# Patient Record
Sex: FEMALE | Race: WHITE | NOT HISPANIC OR LATINO | Employment: FULL TIME | URBAN - METROPOLITAN AREA
[De-identification: names, ages, dates, MRNs, and addresses within clinical notes are randomized per-mention and may not be internally consistent; named-entity substitution may affect disease eponyms.]

---

## 2017-04-24 ENCOUNTER — OFFICE VISIT (OUTPATIENT)
Dept: URGENT CARE | Facility: CLINIC | Age: 27
End: 2017-04-24
Payer: COMMERCIAL

## 2017-04-24 VITALS
TEMPERATURE: 98.6 F | RESPIRATION RATE: 16 BRPM | WEIGHT: 121 LBS | BODY MASS INDEX: 22.26 KG/M2 | DIASTOLIC BLOOD PRESSURE: 80 MMHG | HEART RATE: 85 BPM | OXYGEN SATURATION: 99 % | SYSTOLIC BLOOD PRESSURE: 110 MMHG | HEIGHT: 62 IN

## 2017-04-24 DIAGNOSIS — L50.9 URTICARIA: ICD-10-CM

## 2017-04-24 DIAGNOSIS — J30.2 SEASONAL ALLERGIC RHINITIS, UNSPECIFIED ALLERGIC RHINITIS TRIGGER: ICD-10-CM

## 2017-04-24 PROCEDURE — 99999 PR NO CHARGE: CPT | Performed by: PHYSICIAN ASSISTANT

## 2017-04-24 PROCEDURE — 99204 OFFICE O/P NEW MOD 45 MIN: CPT | Mod: 25 | Performed by: PHYSICIAN ASSISTANT

## 2017-04-24 RX ORDER — TRIAMCINOLONE ACETONIDE 40 MG/ML
40 INJECTION, SUSPENSION INTRA-ARTICULAR; INTRAMUSCULAR ONCE
Status: COMPLETED | OUTPATIENT
Start: 2017-04-24 | End: 2017-04-24

## 2017-04-24 RX ADMIN — TRIAMCINOLONE ACETONIDE 40 MG: 40 INJECTION, SUSPENSION INTRA-ARTICULAR; INTRAMUSCULAR at 11:19

## 2017-04-24 ASSESSMENT — ENCOUNTER SYMPTOMS
VOMITING: 0
MYALGIAS: 1
NAUSEA: 0
COUGH: 0
HEADACHES: 0
SHORTNESS OF BREATH: 0
WHEEZING: 0
PALPITATIONS: 0
DIARRHEA: 0
URTICARIA: 1
SORE THROAT: 1
DIZZINESS: 0
FEVER: 0
ABDOMINAL PAIN: 0
CHILLS: 1

## 2017-04-24 NOTE — Clinical Note
April 24, 2017         Patient: Sakshi Gibbs   YOB: 1990   Date of Visit: 4/24/2017           To Whom it May Concern:    Sakshi Gibbs was seen in my clinic on 4/24/2017. She may return to work on Tuesday April 25th.    If you have any questions or concerns, please don't hesitate to call.        Sincerely,           Jose Raul Mann PA-C  Electronically Signed

## 2017-04-24 NOTE — MR AVS SNAPSHOT
"        Sakshi Lockhartryanne   2017 10:00 AM   Office Visit   MRN: [id number]    Department:  [facility]   Dept Phone:  [phone]    Description:  Female : 1990   Provider:  Jose Raul Mann PA-C           Reason for Visit     Urticaria x 1 day/ face/ shoulders/legs/ forearms/has a lot of allergys      Allergies as of 2017     Allergen Noted Reactions    Sulfa Drugs 2017         You were diagnosed with     Urticaria   [[id number]]       Seasonal allergic rhinitis, unspecified allergic rhinitis trigger   [[id number]]         Vital Signs     Blood Pressure Pulse Temperature Respirations Height Weight    110/80 mmHg 85 37 °C (98.6 °F) 16 1.575 m (5' 2.01\") 54.885 kg (121 lb)    Body Mass Index Oxygen Saturation Breastfeeding? Smoking Status          22.13 kg/m2 99% No Never Smoker         Basic Information     Date Of Birth Sex Race Ethnicity Preferred Language    1990 Female White Non- English      Health Maintenance        Date Due Completion Dates    IMM DTaP/Tdap/Td Vaccine (1 - Tdap) 2008 ---    PAP SMEAR 2010 ---            Current Immunizations     No immunizations on file.      Below and/or attached are the medications your provider expects you to take. Review all of your home medications and newly ordered medications with your provider and/or pharmacist. Follow medication instructions as directed by your provider and/or pharmacist. Please keep your medication list with you and share with your provider. Update the information when medications are discontinued, doses are changed, or new medications (including over-the-counter products) are added; and carry medication information at all times in the event of emergency situations     Allergies:  SULFA DRUGS - (reactions not documented)               Medications  Valid as of: 2017 - 11:26 AM    Generic Name Brand Name Tablet Size Instructions for use    Loratadine   Take  by mouth.        .                 "   Medicines prescribed today were sent to:     [facility]/PHARMACY #[id number] - [place] - [address]    [address] [place] [address]    Phone: [phone] Fax: [phone]    Open 24 Hours?: No      Medication refill instructions:       If your prescription bottle indicates you have medication refills left, it is not necessary to call your provider’s office. Please contact your pharmacy and they will refill your medication.    If your prescription bottle indicates you do not have any refills left, you may request refills at any time through one of the following ways: The online [employer] system (except Urgent Care), by calling your provider’s office, or by asking your pharmacy to contact your provider’s office with a refill request. Medication refills are processed only during regular business hours and may not be available until the next business day. Your provider may request additional information or to have a follow-up visit with you prior to refilling your medication.   *Please Note: Medication refills are assigned a new Rx number when refilled electronically. Your pharmacy may indicate that no refills were authorized even though a new prescription for the same medication is available at the pharmacy. Please request the medicine by name with the pharmacy before contacting your provider for a refill.        Referral     A referral request has been sent to our patient care coordination department. Please allow 3-5 business days for us to process this request and contact you either by phone or mail. If you do not hear from us by the 5th business day, please call us at [phone].           [employer] Status: Patient Declined

## 2017-04-24 NOTE — PROGRESS NOTES
Subjective:      Sakshi Gibbs is a 27 y.o. female who presents with Urticaria            Urticaria  This is a new problem. The current episode started today. The affected locations include the face, left arm, right arm, left upper leg and right upper leg. The rash is characterized by redness, swelling and itchiness. Associated symptoms include congestion and a sore throat. Pertinent negatives include no cough, diarrhea, facial edema, fever, shortness of breath or vomiting. Treatments tried: Nasocort, Claritin. The treatment provided mild relief. Her past medical history is significant for allergies.   EWA epperson has a history of seasonal allergies. She states she gets a Kenalog injection every year. She takes Claritin and Nasacort however inconsistently. She woke up this morning with diffuse urticaria.      PMH:  has no past medical history on file.  MEDS:   Current outpatient prescriptions:   •  Loratadine (CLARITIN PO), Take  by mouth., Disp: , Rfl:   ALLERGIES:   Allergies   Allergen Reactions   • Sulfa Drugs      SURGHX: No past surgical history on file.  SOCHX:  reports that she has never smoked. She does not have any smokeless tobacco history on file. She reports that she drinks alcohol.  FH: family history is not on file.    Review of Systems   Constitutional: Positive for chills. Negative for fever and malaise/fatigue.   HENT: Positive for congestion and sore throat. Negative for ear pain.    Respiratory: Negative for cough, shortness of breath and wheezing.    Cardiovascular: Negative for chest pain, palpitations and leg swelling.   Gastrointestinal: Negative for nausea, vomiting, abdominal pain and diarrhea.   Musculoskeletal: Positive for myalgias.   Skin: Positive for itching and rash.   Neurological: Negative for dizziness and headaches.   Endo/Heme/Allergies: Positive for environmental allergies.       Medications, Allergies, and current problem list reviewed today in Epic  Family history reviewed  "with patient and is not pertinent for today's visit     Objective:     /80 mmHg  Pulse 85  Temp(Src) 37 °C (98.6 °F)  Resp 16  Ht 1.575 m (5' 2.01\")  Wt 54.885 kg (121 lb)  BMI 22.13 kg/m2  SpO2 99%  Breastfeeding? No     Physical Exam   Constitutional: She is oriented to person, place, and time. She appears well-developed and well-nourished. No distress.   HENT:   Head: Normocephalic and atraumatic.   Right Ear: Tympanic membrane and external ear normal.   Left Ear: Tympanic membrane and external ear normal.   Nose: Mucosal edema and rhinorrhea present. Right sinus exhibits no maxillary sinus tenderness. Left sinus exhibits no maxillary sinus tenderness.   Mouth/Throat: Oropharynx is clear and moist. No oropharyngeal exudate.   Eyes: Conjunctivae and EOM are normal. Pupils are equal, round, and reactive to light. Right eye exhibits no discharge. Left eye exhibits no discharge.   Neck: Normal range of motion. Neck supple.   Cardiovascular: Normal rate, regular rhythm and normal heart sounds.    Pulmonary/Chest: Effort normal and breath sounds normal. No respiratory distress. She has no wheezes.   Musculoskeletal: Normal range of motion.   Lymphadenopathy:     She has no cervical adenopathy.   Neurological: She is alert and oriented to person, place, and time.   Skin: Skin is warm and dry. Rash noted. Rash is urticarial (diffuse, pruritic, erythematous.). She is not diaphoretic. There is erythema.   Psychiatric: She has a normal mood and affect. Her behavior is normal. Judgment and thought content normal.   Nursing note and vitals reviewed.              Assessment/Plan:     1. Urticaria  REFERRAL TO ALLERGY    triamcinolone acetonide (KENALOG-40) injection 40 mg   2. Seasonal allergic rhinitis, unspecified allergic rhinitis trigger       Patient having allergic urticaria. Unknown trigger however has a strong history of seasonal allergies. She also has upper respiratory symptoms however vital signs are " normal and there is no sign of bacterial infection. She has no airway involvement or signs of respiratory distress.  She was given a 40 mg injection of Kenalog, monitored for 15 minutes, no reaction.  She will continue to take Claritin and Nasacort as needed  Benadryl for pruritus  Referral to allergy placed  Return to clinic or go to ED if symptoms worsen or persist. Indications for ED discussed at length. Patient voices understanding. Follow-up with your primary care provider in 3-5 days. Red flags discussed.    Please note that this dictation was created using voice recognition software. I have made every reasonable attempt to correct obvious errors, but I expect that there are errors of grammar and possibly content that I did not discover before finalizing the note.

## 2017-04-26 ENCOUNTER — OFFICE VISIT (OUTPATIENT)
Dept: URGENT CARE | Facility: CLINIC | Age: 27
End: 2017-04-26
Payer: COMMERCIAL

## 2017-04-26 VITALS
TEMPERATURE: 98.1 F | DIASTOLIC BLOOD PRESSURE: 82 MMHG | BODY MASS INDEX: 22.26 KG/M2 | RESPIRATION RATE: 16 BRPM | SYSTOLIC BLOOD PRESSURE: 118 MMHG | HEIGHT: 62 IN | OXYGEN SATURATION: 98 % | WEIGHT: 121 LBS | HEART RATE: 86 BPM

## 2017-04-26 DIAGNOSIS — L50.9 URTICARIAL RASH: ICD-10-CM

## 2017-04-26 DIAGNOSIS — Z91.010 PEANUT ALLERGY: ICD-10-CM

## 2017-04-26 PROCEDURE — 99214 OFFICE O/P EST MOD 30 MIN: CPT | Performed by: PHYSICIAN ASSISTANT

## 2017-04-26 RX ORDER — DEXAMETHASONE SODIUM PHOSPHATE 4 MG/ML
8 INJECTION, SOLUTION INTRA-ARTICULAR; INTRALESIONAL; INTRAMUSCULAR; INTRAVENOUS; SOFT TISSUE ONCE
Status: COMPLETED | OUTPATIENT
Start: 2017-04-26 | End: 2017-04-26

## 2017-04-26 RX ORDER — HYDROXYZINE HYDROCHLORIDE 25 MG/1
25-50 TABLET, FILM COATED ORAL 3 TIMES DAILY PRN
Qty: 24 TAB | Refills: 1 | Status: SHIPPED | OUTPATIENT
Start: 2017-04-26 | End: 2017-11-01

## 2017-04-26 RX ORDER — PROPRANOLOL HYDROCHLORIDE 80 MG/1
80 TABLET ORAL 2 TIMES DAILY
Status: ON HOLD | COMMUNITY
End: 2018-10-18

## 2017-04-26 RX ORDER — DIPHENHYDRAMINE HCL 50 MG
50 CAPSULE ORAL EVERY 6 HOURS PRN
COMMUNITY
End: 2017-11-01

## 2017-04-26 RX ORDER — DEXAMETHASONE 4 MG/1
8 TABLET ORAL DAILY
Qty: 6 TAB | Refills: 1 | Status: SHIPPED | OUTPATIENT
Start: 2017-04-26 | End: 2017-04-29

## 2017-04-26 RX ADMIN — DEXAMETHASONE SODIUM PHOSPHATE 8 MG: 4 INJECTION, SOLUTION INTRA-ARTICULAR; INTRALESIONAL; INTRAMUSCULAR; INTRAVENOUS; SOFT TISSUE at 09:30

## 2017-04-26 ASSESSMENT — ENCOUNTER SYMPTOMS
MUSCULOSKELETAL NEGATIVE: 1
URTICARIA: 1
CONSTITUTIONAL NEGATIVE: 1
EYES NEGATIVE: 1
RESPIRATORY NEGATIVE: 1
NEUROLOGICAL NEGATIVE: 1

## 2017-04-26 NOTE — PROGRESS NOTES
"Subjective:      Sakshi Gibbs is a 27 y.o. female who presents with Urticaria            Urticaria  This is a new (seen 2d ago for presumed seas.allerg.; but pt ate some more peanuts yest and face swelled up, rash worsened; ; no trouble breathing but feels little tight in chest) problem. The current episode started in the past 7 days. The problem is unchanged. The rash is diffuse. The rash is characterized by redness and itchiness. Past treatments include antihistamine. The treatment provided mild relief. There is no history of allergies or eczema.       Review of Systems   Constitutional: Negative.    HENT: Negative.    Eyes: Negative.    Respiratory: Negative.    Musculoskeletal: Negative.    Skin: Negative.    Neurological: Negative.           Objective:     /82 mmHg  Pulse 86  Temp(Src) 36.7 °C (98.1 °F)  Resp 16  Ht 1.575 m (5' 2.01\")  Wt 54.885 kg (121 lb)  BMI 22.13 kg/m2  SpO2 98%  Breastfeeding? No     Physical Exam   Constitutional: She is oriented to person, place, and time. She appears well-developed and well-nourished. No distress.   HENT:   Head: Normocephalic and atraumatic.   Eyes: Conjunctivae and EOM are normal. Pupils are equal, round, and reactive to light.   Neck: Normal range of motion. Neck supple.   Cardiovascular: Normal rate and regular rhythm.    Pulmonary/Chest: Effort normal and breath sounds normal. No respiratory distress. She has no wheezes. She has no rales.   Musculoskeletal: She exhibits no edema.   Neurological: She is alert and oriented to person, place, and time.   Skin: Skin is warm and dry. Rash (hive rash diffuse; face) noted.   Psychiatric: She has a normal mood and affect. Her behavior is normal. Judgment and thought content normal.   Nursing note and vitals reviewed.    Filed Vitals:    04/26/17 0912   BP: 118/82   Pulse: 86   Temp: 36.7 °C (98.1 °F)   Resp: 16   Height: 1.575 m (5' 2.01\")   Weight: 54.885 kg (121 lb)   SpO2: 98%     Active " Ambulatory Problems     Diagnosis Date Noted   • No Active Ambulatory Problems     Resolved Ambulatory Problems     Diagnosis Date Noted   • No Resolved Ambulatory Problems     No Additional Past Medical History     Current Outpatient Prescriptions on File Prior to Visit   Medication Sig Dispense Refill   • Loratadine (CLARITIN PO) Take  by mouth.       No current facility-administered medications on file prior to visit.     Gargles, Cepacol lozenges, Aleve/Advil as needed for throat pain  History reviewed. No pertinent family history.  Sulfa drugs              Assessment/Plan:     ·  urt rash, peanut allerg.      · Decadron 8mgIM; [tabs same in 3d prn]; atarax; Aveeno bath  · Has Allerg.refer.in prog.

## 2017-04-26 NOTE — MR AVS SNAPSHOT
"        Sakshi Gibbs   2017 9:15 AM   Office Visit   MRN: [id number]    Department:  [facility]   Dept Phone:  [phone]    Description:  Female : 1990   Provider:  Desmond Varner PA-C           Reason for Visit     Urticaria 2 days      Allergies as of 2017     Allergen Noted Reactions    Sulfa Drugs 2017         You were diagnosed with     Urticarial rash   [[id number]]       Peanut allergy   [[id number]]         Vital Signs     Blood Pressure Pulse Temperature Respirations Height Weight    118/82 mmHg 86 36.7 °C (98.1 °F) 16 1.575 m (5' 2.01\") 54.885 kg (121 lb)    Body Mass Index Oxygen Saturation Breastfeeding? Smoking Status          22.13 kg/m2 98% No Never Smoker         Basic Information     Date Of Birth Sex Race Ethnicity Preferred Language    1990 Female White Non- English      Health Maintenance        Date Due Completion Dates    IMM DTaP/Tdap/Td Vaccine (1 - Tdap) 2008 ---    PAP SMEAR 2010 ---            Current Immunizations     No immunizations on file.      Below and/or attached are the medications your provider expects you to take. Review all of your home medications and newly ordered medications with your provider and/or pharmacist. Follow medication instructions as directed by your provider and/or pharmacist. Please keep your medication list with you and share with your provider. Update the information when medications are discontinued, doses are changed, or new medications (including over-the-counter products) are added; and carry medication information at all times in the event of emergency situations     Allergies:  SULFA DRUGS - (reactions not documented)               Medications  Valid as of: 2017 -  9:41 AM    Generic Name Brand Name Tablet Size Instructions for use    Dexamethasone (Tab) DECADRON 4 MG Take 2 Tabs by mouth every day for 3 days.        DiphenhydrAMINE HCl (Cap) BENADRYL 50 MG Take 50 mg by mouth every 6 " hours as needed.        HydrOXYzine HCl (Tab) ATARAX 25 MG Take 1-2 Tabs by mouth 3 times a day as needed for Itching.        Loratadine   Take  by mouth.        Propranolol HCl (Tab) INDERAL 80 MG Take 80 mg by mouth 2 times a day.        .                 Medicines prescribed today were sent to:     [facility]/PHARMACY #[id number] - [place] - [address] [place] [address]    Phone: [phone] Fax: [phone]    Open 24 Hours?: No      Medication refill instructions:       If your prescription bottle indicates you have medication refills left, it is not necessary to call your provider’s office. Please contact your pharmacy and they will refill your medication.    If your prescription bottle indicates you do not have any refills left, you may request refills at any time through one of the following ways: The online [employer] system (except Urgent Care), by calling your provider’s office, or by asking your pharmacy to contact your provider’s office with a refill request. Medication refills are processed only during regular business hours and may not be available until the next business day. Your provider may request additional information or to have a follow-up visit with you prior to refilling your medication.   *Please Note: Medication refills are assigned a new Rx number when refilled electronically. Your pharmacy may indicate that no refills were authorized even though a new prescription for the same medication is available at the pharmacy. Please request the medicine by name with the pharmacy before contacting your provider for a refill.           [employer] Status: Patient Declined

## 2017-06-12 ENCOUNTER — HOSPITAL ENCOUNTER (OUTPATIENT)
Dept: LAB | Facility: MEDICAL CENTER | Age: 27
End: 2017-06-12
Attending: FAMILY MEDICINE
Payer: COMMERCIAL

## 2017-06-12 LAB
25(OH)D3 SERPL-MCNC: 11 NG/ML (ref 30–100)
ALBUMIN SERPL BCP-MCNC: 4.3 G/DL (ref 3.2–4.9)
ALBUMIN/GLOB SERPL: 1.3 G/DL
ALP SERPL-CCNC: 92 U/L (ref 30–99)
ALT SERPL-CCNC: 20 U/L (ref 2–50)
ANION GAP SERPL CALC-SCNC: 9 MMOL/L (ref 0–11.9)
AST SERPL-CCNC: 20 U/L (ref 12–45)
BILIRUB SERPL-MCNC: 0.4 MG/DL (ref 0.1–1.5)
BUN SERPL-MCNC: 19 MG/DL (ref 8–22)
CALCIUM SERPL-MCNC: 9.9 MG/DL (ref 8.5–10.5)
CHLORIDE SERPL-SCNC: 102 MMOL/L (ref 96–112)
CO2 SERPL-SCNC: 26 MMOL/L (ref 20–33)
CREAT SERPL-MCNC: 0.74 MG/DL (ref 0.5–1.4)
ERYTHROCYTE [DISTWIDTH] IN BLOOD BY AUTOMATED COUNT: 44.6 FL (ref 35.9–50)
FOLATE SERPL-MCNC: 18.7 NG/ML
GFR SERPL CREATININE-BSD FRML MDRD: >60 ML/MIN/1.73 M 2
GLOBULIN SER CALC-MCNC: 3.3 G/DL (ref 1.9–3.5)
GLUCOSE SERPL-MCNC: 84 MG/DL (ref 65–99)
HCT VFR BLD AUTO: 42.7 % (ref 37–47)
HGB BLD-MCNC: 14.1 G/DL (ref 12–16)
MCH RBC QN AUTO: 31.6 PG (ref 27–33)
MCHC RBC AUTO-ENTMCNC: 33 G/DL (ref 33.6–35)
MCV RBC AUTO: 95.7 FL (ref 81.4–97.8)
PLATELET # BLD AUTO: 248 K/UL (ref 164–446)
PMV BLD AUTO: 13.5 FL (ref 9–12.9)
POTASSIUM SERPL-SCNC: 3.7 MMOL/L (ref 3.6–5.5)
PROT SERPL-MCNC: 7.6 G/DL (ref 6–8.2)
RBC # BLD AUTO: 4.46 M/UL (ref 4.2–5.4)
SODIUM SERPL-SCNC: 137 MMOL/L (ref 135–145)
T4 FREE SERPL-MCNC: 1.11 NG/DL (ref 0.53–1.43)
TSH SERPL DL<=0.005 MIU/L-ACNC: 0.51 UIU/ML (ref 0.3–3.7)
VIT B12 SERPL-MCNC: 481 PG/ML (ref 211–911)
WBC # BLD AUTO: 8.6 K/UL (ref 4.8–10.8)

## 2017-06-12 PROCEDURE — 86003 ALLG SPEC IGE CRUDE XTRC EA: CPT | Mod: 91

## 2017-06-12 PROCEDURE — 85027 COMPLETE CBC AUTOMATED: CPT

## 2017-06-12 PROCEDURE — 86038 ANTINUCLEAR ANTIBODIES: CPT

## 2017-06-12 PROCEDURE — 80053 COMPREHEN METABOLIC PANEL: CPT

## 2017-06-12 PROCEDURE — 82607 VITAMIN B-12: CPT

## 2017-06-12 PROCEDURE — 36415 COLL VENOUS BLD VENIPUNCTURE: CPT

## 2017-06-12 PROCEDURE — 84439 ASSAY OF FREE THYROXINE: CPT

## 2017-06-12 PROCEDURE — 82746 ASSAY OF FOLIC ACID SERUM: CPT

## 2017-06-12 PROCEDURE — 84443 ASSAY THYROID STIM HORMONE: CPT

## 2017-06-12 PROCEDURE — 82785 ASSAY OF IGE: CPT

## 2017-06-12 PROCEDURE — 82306 VITAMIN D 25 HYDROXY: CPT

## 2017-06-14 LAB — NUCLEAR IGG SER QL IA: NORMAL

## 2017-06-15 LAB
ALMOND IGE QN: <0.1 KU/L
AVOCADO IGE QN: <0.1 KU/L
BANANA IGE QN: <0.1 KU/L
CELERY IGE QN: <0.1 KU/L
CHESTNUT IGE QN: <0.1 KU/L
COCONUT IGE QN: <0.1 KU/L
COW MILK IGE QN: <0.1 KU/L
DEPRECATED MISC ALLERGEN IGE RAST QL: NORMAL
EGG WHITE IGE QN: <0.1 KU/L
GRAPE IGE QN: <0.1 KU/L
IGE SERPL-ACNC: 43 KU/L
KIWIFRUIT IGE QN: <0.1 KU/L
OAT IGE QN: <0.1 KU/L
PAPAYA IGE QN: 0.13 KU/L
PEANUT IGE QN: <0.1 KU/L
PECAN/HICK NUT IGE QN: <0.1 KU/L
POTATO IGE QN: <0.1 KU/L
SESAME SEED IGE QN: <0.1 KU/L
SOYBEAN IGE QN: <0.1 KU/L
TOMATO IGE QN: <0.1 KU/L
WATERMELON IGE QN: <0.1 KU/L
WHEAT IGE QN: <0.1 KU/L

## 2017-09-21 ENCOUNTER — HOSPITAL ENCOUNTER (OUTPATIENT)
Facility: MEDICAL CENTER | Age: 27
End: 2017-09-21
Payer: COMMERCIAL

## 2017-09-21 LAB
ALBUMIN SERPL BCP-MCNC: 4.4 G/DL (ref 3.2–4.9)
ALBUMIN/GLOB SERPL: 1.6 G/DL
ALP SERPL-CCNC: 63 U/L (ref 30–99)
ALT SERPL-CCNC: 18 U/L (ref 2–50)
ANION GAP SERPL CALC-SCNC: 7 MMOL/L (ref 0–11.9)
AST SERPL-CCNC: 18 U/L (ref 12–45)
BDY FAT % MEASURED: 25.2 %
BILIRUB SERPL-MCNC: 0.6 MG/DL (ref 0.1–1.5)
BP DIAS: 74 MMHG
BP SYS: 122 MMHG
BUN SERPL-MCNC: 14 MG/DL (ref 8–22)
CALCIUM SERPL-MCNC: 9.2 MG/DL (ref 8.5–10.5)
CHLORIDE SERPL-SCNC: 103 MMOL/L (ref 96–112)
CHOLEST SERPL-MCNC: 193 MG/DL (ref 100–199)
CO2 SERPL-SCNC: 28 MMOL/L (ref 20–33)
CREAT SERPL-MCNC: 0.73 MG/DL (ref 0.5–1.4)
DIABETES HTDIA: NO
EVENT NAME HTEVT: NORMAL
FASTING STATUS PATIENT QL REPORTED: NORMAL
GFR SERPL CREATININE-BSD FRML MDRD: >60 ML/MIN/1.73 M 2
GLOBULIN SER CALC-MCNC: 2.8 G/DL (ref 1.9–3.5)
GLUCOSE SERPL-MCNC: 76 MG/DL (ref 65–99)
HDLC SERPL-MCNC: 71 MG/DL
HYPERTENSION HTHYP: NO
LDLC SERPL CALC-MCNC: 104 MG/DL
POTASSIUM SERPL-SCNC: 4.1 MMOL/L (ref 3.6–5.5)
PROT SERPL-MCNC: 7.2 G/DL (ref 6–8.2)
SCREENING LOC CITY HTCIT: NORMAL
SCREENING LOC STATE HTSTA: NORMAL
SCREENING LOCATION HTLOC: NORMAL
SODIUM SERPL-SCNC: 138 MMOL/L (ref 135–145)
SUBSCRIBER ID HTSID: NORMAL
TRIGL SERPL-MCNC: 88 MG/DL (ref 0–149)

## 2017-11-01 ENCOUNTER — OFFICE VISIT (OUTPATIENT)
Dept: MEDICAL GROUP | Facility: PHYSICIAN GROUP | Age: 27
End: 2017-11-01
Payer: COMMERCIAL

## 2017-11-01 VITALS
RESPIRATION RATE: 14 BRPM | BODY MASS INDEX: 22.26 KG/M2 | OXYGEN SATURATION: 100 % | HEIGHT: 62 IN | DIASTOLIC BLOOD PRESSURE: 90 MMHG | TEMPERATURE: 97.9 F | SYSTOLIC BLOOD PRESSURE: 122 MMHG | HEART RATE: 73 BPM | WEIGHT: 121 LBS

## 2017-11-01 DIAGNOSIS — G43.009 MIGRAINE WITHOUT AURA AND WITHOUT STATUS MIGRAINOSUS, NOT INTRACTABLE: ICD-10-CM

## 2017-11-01 DIAGNOSIS — B00.1 RECURRENT COLD SORES: ICD-10-CM

## 2017-11-01 DIAGNOSIS — Z30.09 FAMILY PLANNING: ICD-10-CM

## 2017-11-01 DIAGNOSIS — F90.0 ATTENTION DEFICIT HYPERACTIVITY DISORDER (ADHD), PREDOMINANTLY INATTENTIVE TYPE: ICD-10-CM

## 2017-11-01 PROCEDURE — 99214 OFFICE O/P EST MOD 30 MIN: CPT | Performed by: FAMILY MEDICINE

## 2017-11-01 RX ORDER — DEXTROAMPHETAMINE SACCHARATE, AMPHETAMINE ASPARTATE MONOHYDRATE, DEXTROAMPHETAMINE SULFATE AND AMPHETAMINE SULFATE 7.5; 7.5; 7.5; 7.5 MG/1; MG/1; MG/1; MG/1
30 CAPSULE, EXTENDED RELEASE ORAL DAILY
Qty: 30 CAP | Refills: 0 | Status: SHIPPED | OUTPATIENT
Start: 2017-11-01 | End: 2017-11-01 | Stop reason: SDUPTHER

## 2017-11-01 RX ORDER — VALACYCLOVIR HYDROCHLORIDE 500 MG/1
500 TABLET, FILM COATED ORAL 2 TIMES DAILY
COMMUNITY
End: 2017-12-29 | Stop reason: SDUPTHER

## 2017-11-01 RX ORDER — DEXTROAMPHETAMINE SACCHARATE, AMPHETAMINE ASPARTATE MONOHYDRATE, DEXTROAMPHETAMINE SULFATE AND AMPHETAMINE SULFATE 7.5; 7.5; 7.5; 7.5 MG/1; MG/1; MG/1; MG/1
30 CAPSULE, EXTENDED RELEASE ORAL DAILY
Qty: 30 CAP | Refills: 0 | Status: ON HOLD | OUTPATIENT
Start: 2017-11-01 | End: 2018-10-18

## 2017-11-01 RX ORDER — DEXTROAMPHETAMINE SACCHARATE, AMPHETAMINE ASPARTATE MONOHYDRATE, DEXTROAMPHETAMINE SULFATE AND AMPHETAMINE SULFATE 7.5; 7.5; 7.5; 7.5 MG/1; MG/1; MG/1; MG/1
1 CAPSULE, EXTENDED RELEASE ORAL DAILY
Refills: 0 | COMMUNITY
Start: 2017-10-20 | End: 2017-11-01 | Stop reason: SDUPTHER

## 2017-11-01 RX ORDER — RIZATRIPTAN BENZOATE 10 MG/1
10 TABLET ORAL
Status: ON HOLD | COMMUNITY
End: 2021-03-22

## 2017-11-01 RX ORDER — ERGOCALCIFEROL 1.25 MG/1
1 CAPSULE ORAL
Refills: 5 | Status: ON HOLD | COMMUNITY
Start: 2017-10-16 | End: 2018-10-18

## 2017-11-01 ASSESSMENT — PATIENT HEALTH QUESTIONNAIRE - PHQ9: CLINICAL INTERPRETATION OF PHQ2 SCORE: 0

## 2017-11-01 ASSESSMENT — PAIN SCALES - GENERAL: PAINLEVEL: NO PAIN

## 2017-11-01 NOTE — ASSESSMENT & PLAN NOTE
Stable. Diagnosed in high school. She has taken medication off and on for several years. She took a break for a short period of time when she was pregnant. Currently taking Adderall XR 30mg once a day. She has been on this for the last two years while in graduate school for secondary education.    Denies headache, palpitations, weight changes. She would like to continue this medication.

## 2017-11-01 NOTE — ASSESSMENT & PLAN NOTE
"Off birth control for 6 months. She is hoping to try to conceive again around December when she graduates from her master's program. She tells me that her period is \"all over the place.\" She will have spotting multiple times during the month. No skipped periods. She has questions about infertility.  "

## 2017-11-02 NOTE — PROGRESS NOTES
"Sakshi Gibbs is a 27 y.o. female here to establish care and discuss ADHD and irregular menstrual cycle.    HPI:  Sakshi is a pleasant 27-year-old female here to establish care. Her previous PCP was Dr. Verduzco. She is a teacher and in graduate school.    Attention deficit hyperactivity disorder (ADHD), predominantly inattentive type  Stable. Diagnosed in high school. She has taken medication off and on for several years. She took a break for a short period of time when she was pregnant. Currently taking Adderall XR 30mg once a day. She has been on this for the last two years while in graduate school for secondary education.    Denies headache, palpitations, weight changes. She would like to continue this medication.    Irregular menstrual periods  Family planning  Off birth control for 6 months. She is hoping to try to conceive again around December when she graduates from her master's program. She tells me that her period is \"all over the place.\" She will have spotting multiple times during the month. No skipped periods. She has questions about infertility.    Migraine without aura and without status migrainosus, not intractable  Patient reports frequent headaches located around her temples and behind her eyes. Usually one-sided. Location changes. Describes headache pain as throbbing. Associated symptoms include nausea, sensitivity to light and sound. Present since she was a teenager. Usual frequency is 2-4/month.    Headaches are relieved by Maxalt. Previous treatment includes Imitrex, OTC pain relievers. She also takes propranolol 80mg BID to prevent headaches.    Known triggers include: caffeine, chocolate, weather changes, poor sleep and stress. Usually sleeps 6-8 hours per night.     Denies red flag symptoms, including: headache in same location, persistent headache, headache worse with bending over or sneezing, headache waking up during sleep. Also denies difficulty with speech, focal weakness, fever, " cough, sinus congestion or teeth grinding.    Family Hx: Yes  Prior imaging: No    Recurrent cold sores  This is a chronic and stable problem for the patient. Taking medications as prescribed.  Labs reviewed with the patient as applicable.    Current medicines (including changes today)  Current Outpatient Prescriptions   Medication Sig Dispense Refill   • vitamin D, Ergocalciferol, (DRISDOL) 84416 units Cap capsule Take 1 Cap by mouth 2X A WEEK.  5   • rizatriptan (MAXALT) 10 MG tablet Take 10 mg by mouth Once PRN for Migraine.     • valacyclovir (VALTREX) 500 MG Tab Take 500 mg by mouth 2 times a day.     • amphetamine-dextroamphetamine ER (ADDERALL XR) 30 MG XR capsule Take 1 Cap by mouth every day. 30 Cap 0   • propranolol (INDERAL) 80 MG Tab Take 80 mg by mouth 2 times a day.       No current facility-administered medications for this visit.      She  has a past medical history of ADHD and Migraine.  She  has no past surgical history on file.  Social History   Substance Use Topics   • Smoking status: Never Smoker   • Smokeless tobacco: Never Used   • Alcohol use Yes     Social History     Social History Narrative   • No narrative on file     History reviewed. No pertinent family history.  No family status information on file.     ROS  Constitutional: Negative for fever, chills and malaise/fatigue.   HENT: Negative for congestion.    Eyes: Negative for pain.   Respiratory: Negative for cough and shortness of breath.    Cardiovascular: Negative for leg swelling.   Gastrointestinal: Negative for nausea, vomiting, abdominal pain and diarrhea.   Genitourinary: Negative for dysuria and hematuria.   Skin: Negative for rash.   Neurological: Negative for dizziness, focal weakness and headaches.   Endo/Heme/Allergies: Does not bruise/bleed easily.   Psychiatric/Behavioral: Negative for depression.  The patient is not nervous/anxious.       Objective:     Physical Exam:  Blood pressure 122/90, pulse 73, temperature 36.6 °C  "(97.9 °F), resp. rate 14, height 1.575 m (5' 2\"), weight 54.9 kg (121 lb), last menstrual period 10/30/2017, SpO2 100 %, not currently breastfeeding. Body mass index is 22.13 kg/m².  Constitutional: Alert, no distress, non-toxic appearance.  Skin: Warm, dry, good turgor, no rashes in visible areas.  Eye: +Glasses. Equal, round and reactive, conjunctiva clear, lids normal.  ENMT: Lips without lesions, good dentition, oropharynx clear.  Neck: Trachea midline, no masses, no thyromegaly. No cervical or supraclavicular lymphadenopathy.  Respiratory: Unlabored respiratory effort, lungs clear to auscultation, no wheezes, no ronchi.  Cardiovascular: Normal S1, S2, no murmur, no edema.  Abdomen: Soft, non-tender, no masses, no hepatosplenomegaly.  Neuro: Grossly non-focal. No cranial nerve deficit. Strength and sensation intact.  Psych: Alert and oriented x3, normal affect and mood.    Assessment and Plan:     1. Attention deficit hyperactivity disorder (ADHD), predominantly inattentive type  Chronic and stable. Reviewed previous history, diagnosis, medications and  report. Improvement in focus and concentration with stimulant use. No adverse effects and no concerns for misuse. Controlled substance treatment agreement reviewed and signed. Patient is aware she may be drug tested at future visits. Provided refills x3 months.  - Controlled Substance Treatment Agreement  - amphetamine-dextroamphetamine ER (ADDERALL XR) 30 MG XR capsule; Take 1 Cap by mouth every day.  Dispense: 30 Cap; Refill: 0    2. Family planning  3. Irregular menstrual periods  Strongly encouraged patient to continue OCP's as she is on Adderall and propranolol. We discussed the possible complications. She voiced understanding. At this time, we agreed to monitor her menstrual cycles as she has been under a great deal of stress with completion of her Master's program. Should her cycles remain irregular, will order labwork.    4. Migraine without aura and " without status migrainosus, not intractable  Lengthy discussion of available migraine treatments. Will continue Maxalt and propranolol. Strongly encouraged patient to keep a headache diary to identify triggers. Low tyramine diet hand-out provided. Decrease caffeine, maintain regular sleep schedule and at least 30-minutes of exercise most days.    5. Recurrent cold sores  Chronic and stable. Patient takes Valtrex as needed. Monitor.    Records requested from previous PCP.  Followup: Return in about 3 months (around 2/1/2018) for f/u ADHD, Adderall refill, short.         PLEASE NOTE: This dictation was created using voice recognition software. I have made every reasonable attempt to correct obvious errors, but I expect that there are errors of grammar and possibly content that I did not discover before finalizing the note.

## 2017-11-02 NOTE — ASSESSMENT & PLAN NOTE
Patient reports frequent headaches located around her temples and behind her eyes. Usually one-sided. Location changes. Describes headache pain as throbbing. Associated symptoms include nausea, sensitivity to light and sound. Present since she was a teenager. Usual frequency is 2-4/month.    Headaches are relieved by Maxalt. Previous treatment includes Imitrex, OTC pain relievers. She also takes propranolol 80mg BID to prevent headaches.    Known triggers include: caffeine, chocolate, weather changes, poor sleep and stress. Usually sleeps 6-8 hours per night.     Denies red flag symptoms, including: headache in same location, persistent headache, headache worse with bending over or sneezing, headache waking up during sleep. Also denies difficulty with speech, focal weakness, fever, cough, sinus congestion or teeth grinding.    Family Hx: Yes  Prior imaging: No

## 2017-11-02 NOTE — ASSESSMENT & PLAN NOTE
This is a chronic and stable problem for the patient. Taking medications as prescribed.  Labs reviewed with the patient as applicable.

## 2017-11-20 ENCOUNTER — TELEPHONE (OUTPATIENT)
Dept: MEDICAL GROUP | Facility: PHYSICIAN GROUP | Age: 27
End: 2017-11-20

## 2017-11-20 NOTE — TELEPHONE ENCOUNTER
PA completed for Adderall 30 mg capsule daily.    , please sign and I will fax to [facility].  Thank you.

## 2017-11-21 NOTE — TELEPHONE ENCOUNTER
PA has been approved for 1 year.  Approval notice faxed to pharmacy so they will fill rx and scanned to media.

## 2017-12-29 ENCOUNTER — OFFICE VISIT (OUTPATIENT)
Dept: MEDICAL GROUP | Facility: PHYSICIAN GROUP | Age: 27
End: 2017-12-29
Payer: COMMERCIAL

## 2017-12-29 VITALS
BODY MASS INDEX: 22.26 KG/M2 | SYSTOLIC BLOOD PRESSURE: 102 MMHG | DIASTOLIC BLOOD PRESSURE: 74 MMHG | OXYGEN SATURATION: 96 % | HEART RATE: 68 BPM | TEMPERATURE: 98.1 F | RESPIRATION RATE: 14 BRPM | HEIGHT: 62 IN | WEIGHT: 121 LBS

## 2017-12-29 DIAGNOSIS — B00.1 RECURRENT COLD SORES: ICD-10-CM

## 2017-12-29 DIAGNOSIS — F90.0 ATTENTION DEFICIT HYPERACTIVITY DISORDER (ADHD), PREDOMINANTLY INATTENTIVE TYPE: ICD-10-CM

## 2017-12-29 DIAGNOSIS — Z30.09 FAMILY PLANNING: ICD-10-CM

## 2017-12-29 DIAGNOSIS — J06.9 ACUTE UPPER RESPIRATORY INFECTION: ICD-10-CM

## 2017-12-29 DIAGNOSIS — G43.009 MIGRAINE WITHOUT AURA AND WITHOUT STATUS MIGRAINOSUS, NOT INTRACTABLE: ICD-10-CM

## 2017-12-29 PROCEDURE — 99214 OFFICE O/P EST MOD 30 MIN: CPT | Performed by: FAMILY MEDICINE

## 2017-12-29 RX ORDER — DEXTROAMPHETAMINE SACCHARATE, AMPHETAMINE ASPARTATE MONOHYDRATE, DEXTROAMPHETAMINE SULFATE AND AMPHETAMINE SULFATE 7.5; 7.5; 7.5; 7.5 MG/1; MG/1; MG/1; MG/1
30 CAPSULE, EXTENDED RELEASE ORAL DAILY
Qty: 30 CAP | Refills: 0 | Status: CANCELLED | OUTPATIENT
Start: 2018-01-18 | End: 2018-02-17

## 2017-12-29 RX ORDER — ACYCLOVIR 50 MG/G
OINTMENT TOPICAL
Qty: 1 TUBE | Refills: 1 | Status: ON HOLD | OUTPATIENT
Start: 2017-12-29 | End: 2018-10-18

## 2017-12-29 RX ORDER — AZITHROMYCIN 250 MG/1
TABLET, FILM COATED ORAL
Qty: 6 TAB | Refills: 0 | Status: ON HOLD | OUTPATIENT
Start: 2017-12-29 | End: 2018-10-18

## 2017-12-29 RX ORDER — VALACYCLOVIR HYDROCHLORIDE 500 MG/1
500 TABLET, FILM COATED ORAL 2 TIMES DAILY
Qty: 180 TAB | Refills: 3 | OUTPATIENT
Start: 2017-12-29 | End: 2018-04-10 | Stop reason: SDUPTHER

## 2017-12-29 ASSESSMENT — PAIN SCALES - GENERAL: PAINLEVEL: NO PAIN

## 2017-12-29 NOTE — PROGRESS NOTES
Subjective:   Sakshi Gibbs is a 27 y.o. female here today for family planning and medication management.    Family planning  Patient is off birth control as she is hoping to try to conceive again. She has finished her master's program, but will be working on a project in the spring months. She has questions about her medications and if they are safe to take during pregnancy.    Attention deficit hyperactivity disorder (ADHD), predominantly inattentive type  Stable. Patient has not taken her Adderall recently as her master's program has finished and she is hoping to conceive again soon. She wonders if there are any natural remedies she can try.    Migraine without aura and without status migrainosus, not intractable  Stable. No recent migraines. Present since she was a teenager. Usual frequency is 2-4/month.    Headaches are relieved by Maxalt. Previous treatment includes Imitrex, OTC pain relievers. She also takes propranolol 80mg BID to prevent headaches.    Known triggers include: caffeine, chocolate, weather changes, poor sleep and stress. Usually sleeps 6-8 hours per night.     Denies red flag symptoms, including: headache in same location, persistent headache, headache worse with bending over or sneezing, headache waking up during sleep. Also denies difficulty with speech, focal weakness, fever, cough, sinus congestion or teeth grinding.    Family Hx: Yes  Prior imaging: No    Recurrent cold sores  This is a chronic and stable problem for the patient. Taking medications as prescribed.  She would like a prescription for an ointment. Labs reviewed with the patient as applicable.     Near the end of the visit, patient mentioned that she has had 3 days of nasal congestion. Associated with sneezing and headache. No fevers or chills. No cough or shortness of breath. Her  has been ill recently with similar symptoms and is improving.    Current medicines (including changes today)  Current Outpatient  "Prescriptions   Medication Sig Dispense Refill   • Prenatal MV-Min-Fe Fum-FA-DHA (PRENATAL 1 PO) Take  by mouth.     • acyclovir (ZOVIRAX) 5 % Ointment Apply thin layer to cold sore twice a day as needed. 1 Tube 1   • valacyclovir (VALTREX) 500 MG Tab Take 1 Tab by mouth 2 times a day. 180 Tab 3   • azithromycin (ZITHROMAX) 250 MG Tab Take 2 tablets PO on day #1, then 1 tablet PO daily on days #2-5. 6 Tab 0   • vitamin D, Ergocalciferol, (DRISDOL) 20947 units Cap capsule Take 1 Cap by mouth 2X A WEEK.  5   • rizatriptan (MAXALT) 10 MG tablet Take 10 mg by mouth Once PRN for Migraine.     • amphetamine-dextroamphetamine ER (ADDERALL XR) 30 MG XR capsule Take 1 Cap by mouth every day. 30 Cap 0   • propranolol (INDERAL) 80 MG Tab Take 80 mg by mouth 2 times a day.       No current facility-administered medications for this visit.      She  has a past medical history of ADHD and Migraine.    ROS   See HPI. No chest pain, no shortness of breath, no abdominal pain.     Objective:     Physical Exam:  Blood pressure 102/74, pulse 68, temperature 36.7 °C (98.1 °F), resp. rate 14, height 1.575 m (5' 2\"), weight 54.9 kg (121 lb), last menstrual period 12/22/2017, SpO2 96 %, not currently breastfeeding. Body mass index is 22.13 kg/m².   Constitutional: Alert, no distress.  Skin: Warm, dry, good turgor, no rashes in visible areas.  Eye: Equal, round and reactive, conjunctiva clear, lids normal.  ENMT: TM's clear bilaterally, lips without lesions, good dentition, oropharynx clear.  Neck: Trachea midline, no masses, no thyromegaly. No cervical or supraclavicular lymphadenopathy.  Respiratory: Unlabored respiratory effort, lungs clear to auscultation, no wheezes, no ronchi.  Cardiovascular: Normal S1, S2, no murmur, no edema.  Abdomen: Soft, non-tender, no masses, no hepatosplenomegaly.  Psych: Alert and oriented x3, normal affect and mood.    Assessment and Plan:     1. Family planning  Prenatal counseling provided. Advised " patient to discontinue Adderall, propranolol and Maxalt. She has an OB/GYN that she follows with regularly. We'll continue to monitor.    2. Attention deficit hyperactivity disorder (ADHD), predominantly inattentive type  Chronic and stable. Patient is currently not in classes at this time and does not need medication. She was also advised to discontinue as she plans to become pregnant.    3. Migraine without aura and without status migrainosus, not intractable  Chronic and stable. Patient was advised to discontinue prophylactic and abortive medications and to use Tylenol for migraine headaches.    4. Recurrent cold sores  Chronic and stable. No recent exacerbations. Continue current medications. Ointment provided for patient per her request.  - acyclovir (ZOVIRAX) 5 % Ointment; Apply thin layer to cold sore twice a day as needed.  Dispense: 1 Tube; Refill: 1  - valacyclovir (VALTREX) 500 MG Tab; Take 1 Tab by mouth 2 times a day.  Dispense: 180 Tab; Refill: 3    5. Acute upper respiratory infection  This is a new problem. Patient has had mild upper respiratory infection symptoms. Her vital signs and exam are reassuring. Supportive care advised. Provided patient with a delayed antibiotic prescription to take if symptoms worsen in the next 24-48 hours. Strict return precautions given.  - azithromycin (ZITHROMAX) 250 MG Tab; Take 2 tablets PO on day #1, then 1 tablet PO daily on days #2-5.  Dispense: 6 Tab; Refill: 0    Followup: As needed.         PLEASE NOTE: This dictation was created using voice recognition software. I have made every reasonable attempt to correct obvious errors, but I expect that there are errors of grammar and possibly content that I did not discover before finalizing the note.

## 2017-12-29 NOTE — ASSESSMENT & PLAN NOTE
Stable. No recent migraines. Present since she was a teenager. Usual frequency is 2-4/month.    Headaches are relieved by Maxalt. Previous treatment includes Imitrex, OTC pain relievers. She also takes propranolol 80mg BID to prevent headaches.    Known triggers include: caffeine, chocolate, weather changes, poor sleep and stress. Usually sleeps 6-8 hours per night.     Denies red flag symptoms, including: headache in same location, persistent headache, headache worse with bending over or sneezing, headache waking up during sleep. Also denies difficulty with speech, focal weakness, fever, cough, sinus congestion or teeth grinding.    Family Hx: Yes  Prior imaging: No

## 2017-12-29 NOTE — ASSESSMENT & PLAN NOTE
Stable. Patient has not taken her Adderall recently as her master's program has finished and she is hoping to conceive again soon. She wonders if there are any natural remedies she can try.

## 2017-12-29 NOTE — ASSESSMENT & PLAN NOTE
Patient is off birth control as she is hoping to try to conceive again. She has finished her master's program, but will be working on a project in the spring months. She has questions about her medications and if they are safe to take during pregnancy.

## 2017-12-29 NOTE — ASSESSMENT & PLAN NOTE
This is a chronic and stable problem for the patient. Taking medications as prescribed.  She would like a prescription for an ointment. Labs reviewed with the patient as applicable.

## 2018-02-07 ENCOUNTER — HOSPITAL ENCOUNTER (OUTPATIENT)
Dept: LAB | Facility: MEDICAL CENTER | Age: 28
End: 2018-02-07
Attending: OBSTETRICS & GYNECOLOGY
Payer: COMMERCIAL

## 2018-02-07 LAB
ABO GROUP BLD: NORMAL
APPEARANCE UR: CLEAR
BASOPHILS # BLD AUTO: 0.6 % (ref 0–1.8)
BASOPHILS # BLD: 0.07 K/UL (ref 0–0.12)
BILIRUB UR QL STRIP.AUTO: NEGATIVE
BLD GP AB SCN SERPL QL: NORMAL
COLOR UR: YELLOW
CULTURE IF INDICATED INDCX: NO UA CULTURE
EOSINOPHIL # BLD AUTO: 0.06 K/UL (ref 0–0.51)
EOSINOPHIL NFR BLD: 0.5 % (ref 0–6.9)
ERYTHROCYTE [DISTWIDTH] IN BLOOD BY AUTOMATED COUNT: 44.3 FL (ref 35.9–50)
FSH SERPL-ACNC: 3.7 MIU/ML
GLUCOSE UR STRIP.AUTO-MCNC: NEGATIVE MG/DL
HBV SURFACE AG SER QL: NEGATIVE
HCT VFR BLD AUTO: 40.7 % (ref 37–47)
HCV AB SER QL: NEGATIVE
HGB BLD-MCNC: 13.2 G/DL (ref 12–16)
HIV 1+2 AB+HIV1 P24 AG SERPL QL IA: NON REACTIVE
IMM GRANULOCYTES # BLD AUTO: 0.04 K/UL (ref 0–0.11)
IMM GRANULOCYTES NFR BLD AUTO: 0.3 % (ref 0–0.9)
KETONES UR STRIP.AUTO-MCNC: NEGATIVE MG/DL
LEUKOCYTE ESTERASE UR QL STRIP.AUTO: NEGATIVE
LH SERPL-ACNC: 12 IU/L
LYMPHOCYTES # BLD AUTO: 1.75 K/UL (ref 1–4.8)
LYMPHOCYTES NFR BLD: 15.1 % (ref 22–41)
MCH RBC QN AUTO: 31 PG (ref 27–33)
MCHC RBC AUTO-ENTMCNC: 32.4 G/DL (ref 33.6–35)
MCV RBC AUTO: 95.5 FL (ref 81.4–97.8)
MICRO URNS: ABNORMAL
MONOCYTES # BLD AUTO: 0.84 K/UL (ref 0–0.85)
MONOCYTES NFR BLD AUTO: 7.3 % (ref 0–13.4)
NEUTROPHILS # BLD AUTO: 8.8 K/UL (ref 2–7.15)
NEUTROPHILS NFR BLD: 76.2 % (ref 44–72)
NITRITE UR QL STRIP.AUTO: NEGATIVE
NRBC # BLD AUTO: 0 K/UL
NRBC BLD-RTO: 0 /100 WBC
PH UR STRIP.AUTO: 7 [PH]
PLATELET # BLD AUTO: 238 K/UL (ref 164–446)
PMV BLD AUTO: 13 FL (ref 9–12.9)
PROGEST SERPL-MCNC: 7.66 NG/ML
PROT UR QL STRIP: NEGATIVE MG/DL
RBC # BLD AUTO: 4.26 M/UL (ref 4.2–5.4)
RBC UR QL AUTO: NEGATIVE
RH BLD: NORMAL
RUBV AB SER QL: 150.7 IU/ML
SP GR UR STRIP.AUTO: 1.02
T4 FREE SERPL-MCNC: 0.86 NG/DL (ref 0.53–1.43)
TREPONEMA PALLIDUM IGG+IGM AB [PRESENCE] IN SERUM OR PLASMA BY IMMUNOASSAY: NON REACTIVE
TSH SERPL DL<=0.005 MIU/L-ACNC: 0.86 UIU/ML (ref 0.38–5.33)
UROBILINOGEN UR STRIP.AUTO-MCNC: 0.2 MG/DL
WBC # BLD AUTO: 11.6 K/UL (ref 4.8–10.8)

## 2018-02-07 PROCEDURE — 86900 BLOOD TYPING SEROLOGIC ABO: CPT

## 2018-02-07 PROCEDURE — 86803 HEPATITIS C AB TEST: CPT

## 2018-02-07 PROCEDURE — 36415 COLL VENOUS BLD VENIPUNCTURE: CPT

## 2018-02-07 PROCEDURE — 87389 HIV-1 AG W/HIV-1&-2 AB AG IA: CPT

## 2018-02-07 PROCEDURE — 83001 ASSAY OF GONADOTROPIN (FSH): CPT

## 2018-02-07 PROCEDURE — 86762 RUBELLA ANTIBODY: CPT

## 2018-02-07 PROCEDURE — 87340 HEPATITIS B SURFACE AG IA: CPT

## 2018-02-07 PROCEDURE — 86901 BLOOD TYPING SEROLOGIC RH(D): CPT

## 2018-02-07 PROCEDURE — 84439 ASSAY OF FREE THYROXINE: CPT

## 2018-02-07 PROCEDURE — 84144 ASSAY OF PROGESTERONE: CPT

## 2018-02-07 PROCEDURE — 86780 TREPONEMA PALLIDUM: CPT

## 2018-02-07 PROCEDURE — 86850 RBC ANTIBODY SCREEN: CPT

## 2018-02-07 PROCEDURE — 83002 ASSAY OF GONADOTROPIN (LH): CPT

## 2018-02-07 PROCEDURE — 81003 URINALYSIS AUTO W/O SCOPE: CPT

## 2018-02-07 PROCEDURE — 84443 ASSAY THYROID STIM HORMONE: CPT

## 2018-02-07 PROCEDURE — 85025 COMPLETE CBC W/AUTO DIFF WBC: CPT

## 2018-03-22 ENCOUNTER — HOSPITAL ENCOUNTER (OUTPATIENT)
Dept: LAB | Facility: MEDICAL CENTER | Age: 28
End: 2018-03-22
Attending: OBSTETRICS & GYNECOLOGY
Payer: COMMERCIAL

## 2018-03-22 LAB — AMBIGUOUS DTTM AMBI4: NORMAL

## 2018-03-22 PROCEDURE — 87624 HPV HI-RISK TYP POOLED RSLT: CPT

## 2018-03-22 PROCEDURE — 87491 CHLMYD TRACH DNA AMP PROBE: CPT

## 2018-03-22 PROCEDURE — 88175 CYTOPATH C/V AUTO FLUID REDO: CPT

## 2018-03-22 PROCEDURE — 87591 N.GONORRHOEAE DNA AMP PROB: CPT

## 2018-03-24 LAB
C TRACH DNA GENITAL QL NAA+PROBE: NEGATIVE
CYTOLOGY REG CYTOL: NORMAL
HPV HR 12 DNA CVX QL NAA+PROBE: NEGATIVE
HPV16 DNA SPEC QL NAA+PROBE: NEGATIVE
HPV18 DNA SPEC QL NAA+PROBE: NEGATIVE
N GONORRHOEA DNA GENITAL QL NAA+PROBE: NEGATIVE
SPECIMEN SOURCE: NORMAL
SPECIMEN SOURCE: NORMAL

## 2018-04-10 DIAGNOSIS — B00.1 RECURRENT COLD SORES: ICD-10-CM

## 2018-04-10 RX ORDER — VALACYCLOVIR HYDROCHLORIDE 500 MG/1
500 TABLET, FILM COATED ORAL 2 TIMES DAILY
Qty: 180 TAB | Refills: 3 | Status: SHIPPED
Start: 2018-04-10 | End: 2020-02-03 | Stop reason: SDUPTHER

## 2018-04-20 ENCOUNTER — HOSPITAL ENCOUNTER (OUTPATIENT)
Dept: LAB | Facility: MEDICAL CENTER | Age: 28
End: 2018-04-20
Attending: OBSTETRICS & GYNECOLOGY
Payer: COMMERCIAL

## 2018-04-20 LAB
ABO GROUP BLD: NORMAL
AMORPH CRY #/AREA URNS HPF: PRESENT /HPF
APPEARANCE UR: ABNORMAL
BACTERIA #/AREA URNS HPF: NEGATIVE /HPF
BASOPHILS # BLD AUTO: 0.3 % (ref 0–1.8)
BASOPHILS # BLD: 0.03 K/UL (ref 0–0.12)
BILIRUB UR QL STRIP.AUTO: NEGATIVE
BLD GP AB SCN SERPL QL: NORMAL
COLOR UR: YELLOW
EOSINOPHIL # BLD AUTO: 0.07 K/UL (ref 0–0.51)
EOSINOPHIL NFR BLD: 0.7 % (ref 0–6.9)
EPI CELLS #/AREA URNS HPF: NEGATIVE /HPF
ERYTHROCYTE [DISTWIDTH] IN BLOOD BY AUTOMATED COUNT: 39.7 FL (ref 35.9–50)
GLUCOSE UR STRIP.AUTO-MCNC: NEGATIVE MG/DL
HBV SURFACE AG SER QL: NEGATIVE
HCT VFR BLD AUTO: 34.2 % (ref 37–47)
HCV AB SER QL: NEGATIVE
HGB BLD-MCNC: 11.3 G/DL (ref 12–16)
HIV 1+2 AB+HIV1 P24 AG SERPL QL IA: NON REACTIVE
HYALINE CASTS #/AREA URNS LPF: NORMAL /LPF
IMM GRANULOCYTES # BLD AUTO: 0.03 K/UL (ref 0–0.11)
IMM GRANULOCYTES NFR BLD AUTO: 0.3 % (ref 0–0.9)
KETONES UR STRIP.AUTO-MCNC: NEGATIVE MG/DL
LEUKOCYTE ESTERASE UR QL STRIP.AUTO: NEGATIVE
LYMPHOCYTES # BLD AUTO: 2.06 K/UL (ref 1–4.8)
LYMPHOCYTES NFR BLD: 20.1 % (ref 22–41)
MCH RBC QN AUTO: 29.7 PG (ref 27–33)
MCHC RBC AUTO-ENTMCNC: 33 G/DL (ref 33.6–35)
MCV RBC AUTO: 89.8 FL (ref 81.4–97.8)
MICRO URNS: ABNORMAL
MONOCYTES # BLD AUTO: 0.43 K/UL (ref 0–0.85)
MONOCYTES NFR BLD AUTO: 4.2 % (ref 0–13.4)
NEUTROPHILS # BLD AUTO: 7.63 K/UL (ref 2–7.15)
NEUTROPHILS NFR BLD: 74.4 % (ref 44–72)
NITRITE UR QL STRIP.AUTO: NEGATIVE
NRBC # BLD AUTO: 0 K/UL
NRBC BLD-RTO: 0 /100 WBC
PH UR STRIP.AUTO: 6 [PH]
PLATELET # BLD AUTO: 206 K/UL (ref 164–446)
PMV BLD AUTO: 12.8 FL (ref 9–12.9)
PROT UR QL STRIP: NEGATIVE MG/DL
RBC # BLD AUTO: 3.81 M/UL (ref 4.2–5.4)
RBC # URNS HPF: NORMAL /HPF
RBC UR QL AUTO: NEGATIVE
RH BLD: NORMAL
RUBV AB SER QL: 126.9 IU/ML
SP GR UR STRIP.AUTO: 1.02
TREPONEMA PALLIDUM IGG+IGM AB [PRESENCE] IN SERUM OR PLASMA BY IMMUNOASSAY: NON REACTIVE
UROBILINOGEN UR STRIP.AUTO-MCNC: 0.2 MG/DL
WBC # BLD AUTO: 10.3 K/UL (ref 4.8–10.8)
WBC #/AREA URNS HPF: NORMAL /HPF

## 2018-04-20 PROCEDURE — 86762 RUBELLA ANTIBODY: CPT

## 2018-04-20 PROCEDURE — 86900 BLOOD TYPING SEROLOGIC ABO: CPT

## 2018-04-20 PROCEDURE — 86803 HEPATITIS C AB TEST: CPT

## 2018-04-20 PROCEDURE — 81001 URINALYSIS AUTO W/SCOPE: CPT

## 2018-04-20 PROCEDURE — 85025 COMPLETE CBC W/AUTO DIFF WBC: CPT

## 2018-04-20 PROCEDURE — 87389 HIV-1 AG W/HIV-1&-2 AB AG IA: CPT

## 2018-04-20 PROCEDURE — 87340 HEPATITIS B SURFACE AG IA: CPT

## 2018-04-20 PROCEDURE — 81220 CFTR GENE COM VARIANTS: CPT

## 2018-04-20 PROCEDURE — 36415 COLL VENOUS BLD VENIPUNCTURE: CPT

## 2018-04-20 PROCEDURE — 86780 TREPONEMA PALLIDUM: CPT

## 2018-04-20 PROCEDURE — 86901 BLOOD TYPING SEROLOGIC RH(D): CPT

## 2018-04-20 PROCEDURE — 86850 RBC ANTIBODY SCREEN: CPT

## 2018-04-30 LAB
CF EXPANDED VARIANT PANEL INTERP Q4864: NORMAL
CFTR ALLELE 1 BLD/T QL: NEGATIVE
CFTR ALLELE 1 BLD/T QL: NEGATIVE
CFTR MUT ANL BLD/T: NORMAL

## 2018-05-22 ENCOUNTER — HOSPITAL ENCOUNTER (OUTPATIENT)
Dept: LAB | Facility: MEDICAL CENTER | Age: 28
End: 2018-05-22
Attending: OBSTETRICS & GYNECOLOGY
Payer: COMMERCIAL

## 2018-05-22 LAB — 25(OH)D3 SERPL-MCNC: 53 NG/ML (ref 30–100)

## 2018-05-22 PROCEDURE — 82105 ALPHA-FETOPROTEIN SERUM: CPT

## 2018-05-22 PROCEDURE — 36415 COLL VENOUS BLD VENIPUNCTURE: CPT

## 2018-05-22 PROCEDURE — 82306 VITAMIN D 25 HYDROXY: CPT

## 2018-05-24 LAB
# FETUSES US: NORMAL
AFP MOM SERPL: 0.97
AFP SERPL-MCNC: 44 NG/ML
AGE - REPORTED: 29.5 YR
CURRENT SMOKER: NO
FAMILY MEMBER DISEASES HX: NO
GA METHOD: NORMAL
GA: NORMAL WK
IDDM PATIENT QL: NO
INTEGRATED SCN PATIENT-IMP: NORMAL
SPECIMEN DRAWN SERPL: NORMAL

## 2018-06-05 ENCOUNTER — HOSPITAL ENCOUNTER (OUTPATIENT)
Dept: LAB | Facility: MEDICAL CENTER | Age: 28
End: 2018-06-05
Attending: OBSTETRICS & GYNECOLOGY
Payer: COMMERCIAL

## 2018-06-05 PROCEDURE — 36415 COLL VENOUS BLD VENIPUNCTURE: CPT

## 2018-06-05 PROCEDURE — 81511 FTL CGEN ABNOR FOUR ANAL: CPT

## 2018-06-08 LAB
# FETUSES US: NORMAL
AFP MOM SERPL: 0.81
AFP SERPL-MCNC: 48 NG/ML
AGE - REPORTED: 29.5 YR
CURRENT SMOKER: NO
FAMILY MEMBER DISEASES HX: NO
GA METHOD: NORMAL
GA: NORMAL WK
HCG MOM SERPL: 0.71
HCG SERPL-ACNC: NORMAL IU/L
HX OF HEREDITARY DISORDERS: NO
IDDM PATIENT QL: NO
INHIBIN A MOM SERPL: 0.7
INHIBIN A SERPL-MCNC: 127 PG/ML
INTEGRATED SCN PATIENT-IMP: NORMAL
PATHOLOGY STUDY: NORMAL
SPECIMEN DRAWN SERPL: NORMAL
U ESTRIOL MOM SERPL: 1.24
U ESTRIOL SERPL-MCNC: 2.67 NG/ML

## 2018-06-12 LAB
# FETUSES US: NORMAL
AFP MOM SERPL: 0.97
AFP SERPL-MCNC: 44 NG/ML
AGE - REPORTED: 29.5 YR
CURRENT SMOKER: NO
FAMILY MEMBER DISEASES HX: NO
GA METHOD: NORMAL
GA: NORMAL WK
HCG MOM SERPL: 0.78
HCG SERPL-ACNC: NORMAL IU/L
HX OF HEREDITARY DISORDERS: NO
IDDM PATIENT QL: NO
INHIBIN A MOM SERPL: 0.71
INHIBIN A SERPL-MCNC: 118 PG/ML
INTEGRATED SCN PATIENT-IMP: NORMAL
PATHOLOGY STUDY: NORMAL
SPECIMEN DRAWN SERPL: NORMAL
U ESTRIOL MOM SERPL: 1.45
U ESTRIOL SERPL-MCNC: 2.12 NG/ML

## 2018-07-02 ENCOUNTER — TELEPHONE (OUTPATIENT)
Dept: MEDICAL GROUP | Facility: PHYSICIAN GROUP | Age: 28
End: 2018-07-02

## 2018-07-02 NOTE — TELEPHONE ENCOUNTER
Phone Number Called: [phone] (home)     Message: Pt notified of results below. No questions at this time.     Left Message for patient to call back: N\A

## 2018-07-02 NOTE — TELEPHONE ENCOUNTER
VOICEMAIL  1. Caller Name: Sakshi Gibbs                        Call Back Number: [phone] (home)       2. Message: Pt called this morning asking for a refill of vitamin D, Ergocalciferol, (DRISDOL) 30710 units Cap capsule. Pt states the original Rx came from .  Pt's most recent D25 was 5/22/18 and she was at 52.     3. Patient approves office to leave a detailed voicemail/MyChart message: N\A

## 2018-07-02 NOTE — TELEPHONE ENCOUNTER
Vitamin D level is normal and therefore high-dose supplementation is no longer required.  I would recommend she  vitamin D3 over-the-counter at take 2,000 units daily.  Laquita Mars M.D.

## 2018-07-05 ENCOUNTER — TELEPHONE (OUTPATIENT)
Dept: MEDICAL GROUP | Facility: PHYSICIAN GROUP | Age: 28
End: 2018-07-05

## 2018-07-05 NOTE — TELEPHONE ENCOUNTER
Please let patient know that I unfortunately have a full panel and cannot take on any new patients.  I would recommend patient be seen in urgent care today.  Laquita Mars M.D.

## 2018-07-05 NOTE — TELEPHONE ENCOUNTER
1. Caller Name: Sakshi Gibbs         Call Back Number: [phone] (home)     Patient called and left a . Patient wanted to know if her  can also Establish. His name is Henry Gibbs 11/30/1986.    Wife was mentioned Henry having a fall today.Wife said he stuck himself with a needle. Wife said they want to wait until he's seen with  if he will be able too. I recommended . Patient wanted me to ask if Henry should wait until an appointment with  or go to UC. Please Advise. Per wife  is healthy. LM

## 2018-07-05 NOTE — TELEPHONE ENCOUNTER
Phone Number Called: [phone] (home)       Message: Called and left patient a message to chanel back to get  message. LM     Left Message for patient to call back: yes

## 2018-07-18 ENCOUNTER — HOSPITAL ENCOUNTER (OUTPATIENT)
Dept: LAB | Facility: MEDICAL CENTER | Age: 28
End: 2018-07-18
Attending: OBSTETRICS & GYNECOLOGY
Payer: COMMERCIAL

## 2018-07-18 LAB
BASOPHILS # BLD AUTO: 0.2 % (ref 0–1.8)
BASOPHILS # BLD: 0.02 K/UL (ref 0–0.12)
EOSINOPHIL # BLD AUTO: 0.06 K/UL (ref 0–0.51)
EOSINOPHIL NFR BLD: 0.7 % (ref 0–6.9)
ERYTHROCYTE [DISTWIDTH] IN BLOOD BY AUTOMATED COUNT: 46.2 FL (ref 35.9–50)
GLUCOSE 1H P 50 G GLC PO SERPL-MCNC: 70 MG/DL (ref 70–139)
HCT VFR BLD AUTO: 35.1 % (ref 37–47)
HGB BLD-MCNC: 11.3 G/DL (ref 12–16)
IMM GRANULOCYTES # BLD AUTO: 0.03 K/UL (ref 0–0.11)
IMM GRANULOCYTES NFR BLD AUTO: 0.3 % (ref 0–0.9)
LYMPHOCYTES # BLD AUTO: 1.48 K/UL (ref 1–4.8)
LYMPHOCYTES NFR BLD: 16.1 % (ref 22–41)
MCH RBC QN AUTO: 30.3 PG (ref 27–33)
MCHC RBC AUTO-ENTMCNC: 32.2 G/DL (ref 33.6–35)
MCV RBC AUTO: 94.1 FL (ref 81.4–97.8)
MONOCYTES # BLD AUTO: 0.47 K/UL (ref 0–0.85)
MONOCYTES NFR BLD AUTO: 5.1 % (ref 0–13.4)
NEUTROPHILS # BLD AUTO: 7.12 K/UL (ref 2–7.15)
NEUTROPHILS NFR BLD: 77.6 % (ref 44–72)
NRBC # BLD AUTO: 0 K/UL
NRBC BLD-RTO: 0 /100 WBC
PLATELET # BLD AUTO: 169 K/UL (ref 164–446)
PMV BLD AUTO: 12.9 FL (ref 9–12.9)
RBC # BLD AUTO: 3.73 M/UL (ref 4.2–5.4)
WBC # BLD AUTO: 9.2 K/UL (ref 4.8–10.8)

## 2018-07-18 PROCEDURE — 85025 COMPLETE CBC W/AUTO DIFF WBC: CPT

## 2018-07-18 PROCEDURE — 82950 GLUCOSE TEST: CPT

## 2018-07-18 PROCEDURE — 36415 COLL VENOUS BLD VENIPUNCTURE: CPT

## 2018-08-21 ENCOUNTER — NON-PROVIDER VISIT (OUTPATIENT)
Dept: MEDICAL GROUP | Facility: PHYSICIAN GROUP | Age: 28
End: 2018-08-21
Payer: COMMERCIAL

## 2018-08-21 DIAGNOSIS — Z23 NEED FOR TDAP VACCINATION: ICD-10-CM

## 2018-08-21 PROCEDURE — 90471 IMMUNIZATION ADMIN: CPT | Performed by: FAMILY MEDICINE

## 2018-08-21 PROCEDURE — 90715 TDAP VACCINE 7 YRS/> IM: CPT | Performed by: FAMILY MEDICINE

## 2018-08-21 NOTE — PROGRESS NOTES
"Sakshi Gibbs is a 28 y.o. female here for a non-provider visit for:   TDAP    Reason for immunization: Overdue/Provider Recommended  Immunization records indicate need for vaccine: Yes, confirmed with Epic and confirmed with [place] [employer]  Minimum interval has been met for this vaccine: Yes  ABN completed: Yes    Order and dose verified by: MB  VIS Dated  8/15/16 was given to patient: Yes  All IAC Questionnaire questions were answered \"No.\"    Patient tolerated injection and no adverse effects were observed or reported: Yes    Pt scheduled for next dose in series: Not Indicated  "

## 2018-09-07 ENCOUNTER — HOSPITAL ENCOUNTER (OUTPATIENT)
Dept: LAB | Facility: MEDICAL CENTER | Age: 28
End: 2018-09-07
Attending: OBSTETRICS & GYNECOLOGY
Payer: COMMERCIAL

## 2018-09-07 PROCEDURE — 82731 ASSAY OF FETAL FIBRONECTIN: CPT

## 2018-09-08 LAB
AMBIGUOUS DTTM AMBI4: NORMAL
FIBRONECTIN FETAL SPEC QL: NEGATIVE

## 2018-10-18 ENCOUNTER — HOSPITAL ENCOUNTER (INPATIENT)
Facility: MEDICAL CENTER | Age: 28
LOS: 2 days | End: 2018-10-20
Attending: OBSTETRICS & GYNECOLOGY | Admitting: OBSTETRICS & GYNECOLOGY
Payer: COMMERCIAL

## 2018-10-18 DIAGNOSIS — G89.18 POSTOPERATIVE PAIN: Primary | ICD-10-CM

## 2018-10-18 LAB
BASOPHILS # BLD AUTO: 0.2 % (ref 0–1.8)
BASOPHILS # BLD: 0.03 K/UL (ref 0–0.12)
EOSINOPHIL # BLD AUTO: 0.1 K/UL (ref 0–0.51)
EOSINOPHIL NFR BLD: 0.8 % (ref 0–6.9)
ERYTHROCYTE [DISTWIDTH] IN BLOOD BY AUTOMATED COUNT: 42.4 FL (ref 35.9–50)
HCT VFR BLD AUTO: 36.2 % (ref 37–47)
HGB BLD-MCNC: 12.1 G/DL (ref 12–16)
HOLDING TUBE BB 8507: NORMAL
IMM GRANULOCYTES # BLD AUTO: 0.1 K/UL (ref 0–0.11)
IMM GRANULOCYTES NFR BLD AUTO: 0.8 % (ref 0–0.9)
LYMPHOCYTES # BLD AUTO: 2.25 K/UL (ref 1–4.8)
LYMPHOCYTES NFR BLD: 17.9 % (ref 22–41)
MCH RBC QN AUTO: 30.1 PG (ref 27–33)
MCHC RBC AUTO-ENTMCNC: 33.4 G/DL (ref 33.6–35)
MCV RBC AUTO: 90 FL (ref 81.4–97.8)
MONOCYTES # BLD AUTO: 0.83 K/UL (ref 0–0.85)
MONOCYTES NFR BLD AUTO: 6.6 % (ref 0–13.4)
NEUTROPHILS # BLD AUTO: 9.29 K/UL (ref 2–7.15)
NEUTROPHILS NFR BLD: 73.7 % (ref 44–72)
NRBC # BLD AUTO: 0 K/UL
NRBC BLD-RTO: 0 /100 WBC
PLATELET # BLD AUTO: 127 K/UL (ref 164–446)
PMV BLD AUTO: 13.9 FL (ref 9–12.9)
RBC # BLD AUTO: 4.02 M/UL (ref 4.2–5.4)
WBC # BLD AUTO: 12.6 K/UL (ref 4.8–10.8)

## 2018-10-18 PROCEDURE — 700112 HCHG RX REV CODE 229: Performed by: OBSTETRICS & GYNECOLOGY

## 2018-10-18 PROCEDURE — A9270 NON-COVERED ITEM OR SERVICE: HCPCS | Performed by: OBSTETRICS & GYNECOLOGY

## 2018-10-18 PROCEDURE — 700102 HCHG RX REV CODE 250 W/ 637 OVERRIDE(OP)

## 2018-10-18 PROCEDURE — 36415 COLL VENOUS BLD VENIPUNCTURE: CPT

## 2018-10-18 PROCEDURE — 700102 HCHG RX REV CODE 250 W/ 637 OVERRIDE(OP): Performed by: ANESTHESIOLOGY

## 2018-10-18 PROCEDURE — 306828 HCHG ANES-TIME GENERAL: Performed by: OBSTETRICS & GYNECOLOGY

## 2018-10-18 PROCEDURE — 302151 K-PAD 3X20: Performed by: OBSTETRICS & GYNECOLOGY

## 2018-10-18 PROCEDURE — 700111 HCHG RX REV CODE 636 W/ 250 OVERRIDE (IP)

## 2018-10-18 PROCEDURE — A9270 NON-COVERED ITEM OR SERVICE: HCPCS

## 2018-10-18 PROCEDURE — 85025 COMPLETE CBC W/AUTO DIFF WBC: CPT

## 2018-10-18 PROCEDURE — 85027 COMPLETE CBC AUTOMATED: CPT

## 2018-10-18 PROCEDURE — 306288 HCHG RETRACTOR C SECTION LG

## 2018-10-18 PROCEDURE — 770002 HCHG ROOM/CARE - OB PRIVATE (112)

## 2018-10-18 PROCEDURE — 700101 HCHG RX REV CODE 250

## 2018-10-18 PROCEDURE — 304966 HCHG RECOVERY SVSC TIME ADDL 1/2 HR: Performed by: OBSTETRICS & GYNECOLOGY

## 2018-10-18 PROCEDURE — 59514 CESAREAN DELIVERY ONLY: CPT

## 2018-10-18 PROCEDURE — 700105 HCHG RX REV CODE 258: Performed by: ANESTHESIOLOGY

## 2018-10-18 PROCEDURE — 700102 HCHG RX REV CODE 250 W/ 637 OVERRIDE(OP): Performed by: OBSTETRICS & GYNECOLOGY

## 2018-10-18 PROCEDURE — 700111 HCHG RX REV CODE 636 W/ 250 OVERRIDE (IP): Performed by: ANESTHESIOLOGY

## 2018-10-18 PROCEDURE — 503052 HCHG HEMOSTAT POWDER-5GRAM

## 2018-10-18 PROCEDURE — 305385 HCHG SURGICAL SERVICES 1/4 HOUR: Performed by: OBSTETRICS & GYNECOLOGY

## 2018-10-18 PROCEDURE — 302131 K PAD MOTOR: Performed by: OBSTETRICS & GYNECOLOGY

## 2018-10-18 PROCEDURE — 304964 HCHG RECOVERY ROOM TIME 1HR: Performed by: OBSTETRICS & GYNECOLOGY

## 2018-10-18 RX ORDER — HYDROMORPHONE HYDROCHLORIDE 2 MG/ML
0.4 INJECTION, SOLUTION INTRAMUSCULAR; INTRAVENOUS; SUBCUTANEOUS
Status: DISCONTINUED | OUTPATIENT
Start: 2018-10-18 | End: 2018-10-18 | Stop reason: HOSPADM

## 2018-10-18 RX ORDER — OXYCODONE HYDROCHLORIDE AND ACETAMINOPHEN 5; 325 MG/1; MG/1
2 TABLET ORAL
Status: COMPLETED | OUTPATIENT
Start: 2018-10-18 | End: 2018-10-18

## 2018-10-18 RX ORDER — DOCUSATE SODIUM 100 MG/1
100 CAPSULE, LIQUID FILLED ORAL 2 TIMES DAILY PRN
Status: DISCONTINUED | OUTPATIENT
Start: 2018-10-18 | End: 2018-10-20 | Stop reason: HOSPADM

## 2018-10-18 RX ORDER — OXYCODONE HYDROCHLORIDE AND ACETAMINOPHEN 5; 325 MG/1; MG/1
TABLET ORAL
Status: COMPLETED
Start: 2018-10-18 | End: 2018-10-18

## 2018-10-18 RX ORDER — SIMETHICONE 80 MG
80 TABLET,CHEWABLE ORAL 4 TIMES DAILY PRN
Status: DISCONTINUED | OUTPATIENT
Start: 2018-10-18 | End: 2018-10-20 | Stop reason: HOSPADM

## 2018-10-18 RX ORDER — ONDANSETRON 2 MG/ML
4 INJECTION INTRAMUSCULAR; INTRAVENOUS
Status: DISCONTINUED | OUTPATIENT
Start: 2018-10-18 | End: 2018-10-18 | Stop reason: HOSPADM

## 2018-10-18 RX ORDER — ACETAMINOPHEN 325 MG/1
325 TABLET ORAL EVERY 4 HOURS PRN
Status: DISCONTINUED | OUTPATIENT
Start: 2018-10-18 | End: 2018-10-20 | Stop reason: HOSPADM

## 2018-10-18 RX ORDER — SODIUM CHLORIDE, SODIUM LACTATE, POTASSIUM CHLORIDE, CALCIUM CHLORIDE 600; 310; 30; 20 MG/100ML; MG/100ML; MG/100ML; MG/100ML
INJECTION, SOLUTION INTRAVENOUS CONTINUOUS
Status: DISCONTINUED | OUTPATIENT
Start: 2018-10-18 | End: 2018-10-20 | Stop reason: HOSPADM

## 2018-10-18 RX ORDER — SODIUM CHLORIDE, SODIUM GLUCONATE, SODIUM ACETATE, POTASSIUM CHLORIDE AND MAGNESIUM CHLORIDE 526; 502; 368; 37; 30 MG/100ML; MG/100ML; MG/100ML; MG/100ML; MG/100ML
1500 INJECTION, SOLUTION INTRAVENOUS ONCE
Status: DISCONTINUED | OUTPATIENT
Start: 2018-10-18 | End: 2018-10-18 | Stop reason: HOSPADM

## 2018-10-18 RX ORDER — CARBOPROST TROMETHAMINE 250 UG/ML
250 INJECTION, SOLUTION INTRAMUSCULAR
Status: DISCONTINUED | OUTPATIENT
Start: 2018-10-18 | End: 2018-10-20 | Stop reason: HOSPADM

## 2018-10-18 RX ORDER — CITRIC ACID/SODIUM CITRATE 334-500MG
30 SOLUTION, ORAL ORAL ONCE
Status: COMPLETED | OUTPATIENT
Start: 2018-10-18 | End: 2018-10-18

## 2018-10-18 RX ORDER — SODIUM CHLORIDE, SODIUM LACTATE, POTASSIUM CHLORIDE, CALCIUM CHLORIDE 600; 310; 30; 20 MG/100ML; MG/100ML; MG/100ML; MG/100ML
INJECTION, SOLUTION INTRAVENOUS PRN
Status: DISCONTINUED | OUTPATIENT
Start: 2018-10-18 | End: 2018-10-20 | Stop reason: HOSPADM

## 2018-10-18 RX ORDER — IBUPROFEN 600 MG/1
600 TABLET ORAL EVERY 6 HOURS PRN
Status: DISCONTINUED | OUTPATIENT
Start: 2018-10-18 | End: 2018-10-20 | Stop reason: HOSPADM

## 2018-10-18 RX ORDER — MISOPROSTOL 200 UG/1
800 TABLET ORAL
Status: DISCONTINUED | OUTPATIENT
Start: 2018-10-18 | End: 2018-10-20 | Stop reason: HOSPADM

## 2018-10-18 RX ORDER — MISOPROSTOL 200 UG/1
600 TABLET ORAL
Status: DISCONTINUED | OUTPATIENT
Start: 2018-10-18 | End: 2018-10-20 | Stop reason: HOSPADM

## 2018-10-18 RX ORDER — BISACODYL 10 MG
10 SUPPOSITORY, RECTAL RECTAL PRN
Status: DISCONTINUED | OUTPATIENT
Start: 2018-10-18 | End: 2018-10-20 | Stop reason: HOSPADM

## 2018-10-18 RX ORDER — METOCLOPRAMIDE HYDROCHLORIDE 5 MG/ML
10 INJECTION INTRAMUSCULAR; INTRAVENOUS ONCE
Status: DISCONTINUED | OUTPATIENT
Start: 2018-10-18 | End: 2018-10-18 | Stop reason: HOSPADM

## 2018-10-18 RX ORDER — HYDROMORPHONE HYDROCHLORIDE 2 MG/ML
0.2 INJECTION, SOLUTION INTRAMUSCULAR; INTRAVENOUS; SUBCUTANEOUS
Status: DISCONTINUED | OUTPATIENT
Start: 2018-10-18 | End: 2018-10-18 | Stop reason: HOSPADM

## 2018-10-18 RX ORDER — CETIRIZINE HYDROCHLORIDE 10 MG/1
10 TABLET ORAL DAILY
Status: ON HOLD | COMMUNITY
End: 2018-10-20

## 2018-10-18 RX ORDER — OXYCODONE HYDROCHLORIDE AND ACETAMINOPHEN 5; 325 MG/1; MG/1
1 TABLET ORAL
Status: COMPLETED | OUTPATIENT
Start: 2018-10-18 | End: 2018-10-18

## 2018-10-18 RX ORDER — OXYCODONE HYDROCHLORIDE AND ACETAMINOPHEN 5; 325 MG/1; MG/1
1 TABLET ORAL EVERY 4 HOURS PRN
Status: DISCONTINUED | OUTPATIENT
Start: 2018-10-18 | End: 2018-10-19

## 2018-10-18 RX ORDER — FERROUS GLUCONATE 324(38)MG
324 TABLET ORAL
Status: ON HOLD | COMMUNITY
End: 2018-10-20

## 2018-10-18 RX ORDER — METOCLOPRAMIDE HYDROCHLORIDE 5 MG/ML
10 INJECTION INTRAMUSCULAR; INTRAVENOUS ONCE
Status: COMPLETED | OUTPATIENT
Start: 2018-10-18 | End: 2018-10-18

## 2018-10-18 RX ORDER — VITAMIN A ACETATE, BETA CAROTENE, ASCORBIC ACID, CHOLECALCIFEROL, .ALPHA.-TOCOPHEROL ACETATE, DL-, THIAMINE MONONITRATE, RIBOFLAVIN, NIACINAMIDE, PYRIDOXINE HYDROCHLORIDE, FOLIC ACID, CYANOCOBALAMIN, CALCIUM CARBONATE, FERROUS FUMARATE, ZINC OXIDE, CUPRIC OXIDE 3080; 12; 120; 400; 1; 1.84; 3; 20; 22; 920; 25; 200; 27; 10; 2 [IU]/1; UG/1; MG/1; [IU]/1; MG/1; MG/1; MG/1; MG/1; MG/1; [IU]/1; MG/1; MG/1; MG/1; MG/1; MG/1
1 TABLET, FILM COATED ORAL EVERY MORNING
Status: DISCONTINUED | OUTPATIENT
Start: 2018-10-18 | End: 2018-10-20 | Stop reason: HOSPADM

## 2018-10-18 RX ORDER — HYDROMORPHONE HYDROCHLORIDE 2 MG/ML
0.1 INJECTION, SOLUTION INTRAMUSCULAR; INTRAVENOUS; SUBCUTANEOUS
Status: DISCONTINUED | OUTPATIENT
Start: 2018-10-18 | End: 2018-10-18 | Stop reason: HOSPADM

## 2018-10-18 RX ORDER — ONDANSETRON 2 MG/ML
4 INJECTION INTRAMUSCULAR; INTRAVENOUS EVERY 4 HOURS PRN
Status: DISCONTINUED | OUTPATIENT
Start: 2018-10-18 | End: 2018-10-20 | Stop reason: HOSPADM

## 2018-10-18 RX ORDER — CITRIC ACID/SODIUM CITRATE 334-500MG
30 SOLUTION, ORAL ORAL ONCE
Status: DISCONTINUED | OUTPATIENT
Start: 2018-10-18 | End: 2018-10-18 | Stop reason: HOSPADM

## 2018-10-18 RX ORDER — SODIUM CHLORIDE, SODIUM GLUCONATE, SODIUM ACETATE, POTASSIUM CHLORIDE AND MAGNESIUM CHLORIDE 526; 502; 368; 37; 30 MG/100ML; MG/100ML; MG/100ML; MG/100ML; MG/100ML
1500 INJECTION, SOLUTION INTRAVENOUS ONCE
Status: COMPLETED | OUTPATIENT
Start: 2018-10-18 | End: 2018-10-18

## 2018-10-18 RX ORDER — OXYCODONE AND ACETAMINOPHEN 10; 325 MG/1; MG/1
1 TABLET ORAL EVERY 4 HOURS PRN
Status: DISCONTINUED | OUTPATIENT
Start: 2018-10-18 | End: 2018-10-19

## 2018-10-18 RX ADMIN — OXYCODONE HYDROCHLORIDE AND ACETAMINOPHEN 1 TABLET: 10; 325 TABLET ORAL at 23:03

## 2018-10-18 RX ADMIN — IBUPROFEN 600 MG: 600 TABLET, FILM COATED ORAL at 19:26

## 2018-10-18 RX ADMIN — OXYCODONE AND ACETAMINOPHEN 1 TABLET: 5; 325 TABLET ORAL at 19:26

## 2018-10-18 RX ADMIN — SODIUM CHLORIDE, SODIUM GLUCONATE, SODIUM ACETATE, POTASSIUM CHLORIDE AND MAGNESIUM CHLORIDE 1500 ML: 526; 502; 368; 37; 30 INJECTION, SOLUTION INTRAVENOUS at 06:58

## 2018-10-18 RX ADMIN — DOCUSATE SODIUM 100 MG: 100 CAPSULE, LIQUID FILLED ORAL at 15:02

## 2018-10-18 RX ADMIN — OXYCODONE HYDROCHLORIDE AND ACETAMINOPHEN 1 TABLET: 5; 325 TABLET ORAL at 10:52

## 2018-10-18 RX ADMIN — METOCLOPRAMIDE 10 MG: 5 INJECTION, SOLUTION INTRAMUSCULAR; INTRAVENOUS at 07:01

## 2018-10-18 RX ADMIN — SODIUM CITRATE AND CITRIC ACID MONOHYDRATE 30 ML: 500; 334 SOLUTION ORAL at 08:42

## 2018-10-18 RX ADMIN — FAMOTIDINE 20 MG: 10 INJECTION, SOLUTION INTRAVENOUS at 07:01

## 2018-10-18 RX ADMIN — ONDANSETRON HYDROCHLORIDE 4 MG: 2 INJECTION INTRAMUSCULAR; INTRAVENOUS at 14:57

## 2018-10-18 RX ADMIN — OXYCODONE AND ACETAMINOPHEN 1 TABLET: 5; 325 TABLET ORAL at 13:01

## 2018-10-18 RX ADMIN — Medication 2000 ML/HR: at 09:18

## 2018-10-18 RX ADMIN — ONDANSETRON HYDROCHLORIDE 4 MG: 2 INJECTION INTRAMUSCULAR; INTRAVENOUS at 19:14

## 2018-10-18 RX ADMIN — SIMETHICONE CHEW TAB 80 MG 80 MG: 80 TABLET ORAL at 15:02

## 2018-10-18 RX ADMIN — SIMETHICONE CHEW TAB 80 MG 80 MG: 80 TABLET ORAL at 23:04

## 2018-10-18 RX ADMIN — OXYCODONE AND ACETAMINOPHEN 1 TABLET: 5; 325 TABLET ORAL at 15:02

## 2018-10-18 RX ADMIN — OXYCODONE AND ACETAMINOPHEN 1 TABLET: 5; 325 TABLET ORAL at 10:52

## 2018-10-18 RX ADMIN — OXYTOCIN 125 ML/HR: 10 INJECTION, SOLUTION INTRAMUSCULAR; INTRAVENOUS at 11:05

## 2018-10-18 ASSESSMENT — PAIN SCALES - GENERAL
PAINLEVEL_OUTOF10: 0
PAINLEVEL_OUTOF10: 7
PAINLEVEL_OUTOF10: 3
PAINLEVEL_OUTOF10: 3
PAINLEVEL_OUTOF10: 0
PAINLEVEL_OUTOF10: 2
PAINLEVEL_OUTOF10: 2
PAINLEVEL_OUTOF10: 0
PAINLEVEL_OUTOF10: 5

## 2018-10-18 ASSESSMENT — LIFESTYLE VARIABLES: EVER_SMOKED: NEVER

## 2018-10-18 NOTE — PROGRESS NOTES
Patient asking to get up out of bed. Assisted patient out of bed, changed pads, educated on heavy bleeding and removed sequentials. Patient states she is nauseated. Provided zofran per md order.

## 2018-10-18 NOTE — OR SURGEON
Immediate Post OP Note    PreOp Diagnosis: IUP at 39 weeks  Previous c/s    PostOp Diagnosis: same    Procedure(s):  REPEAT C SECTION - LOW TRANSVERSE - Wound Class: Clean Contaminated    Surgeon(s):  MACIE Hartman M.D.    Anesthesiologist/Type of Anesthesia:  Anesthesiologist: Isael Georges M.D./Spinal    Surgical Staff:  Circulator: Chiara Aguayo R.N.  Scrub Person: Mami Herrmann    Specimens removed if any:  none    Estimated Blood Loss: 500cc  IVF: 1.5 LR   UO: 50 cc  Findings: vertex, male with apgars 8 and 8, 7 # 6oz, normal uterus, tubes and ovaries    Complications: none        10/18/2018 8:31 AM Dia Beauchamp M.D.

## 2018-10-18 NOTE — OP REPORT
DATE OF SERVICE:  10/18/2018    PREOPERATIVE DIAGNOSES:  1.  Intrauterine pregnancy at 39 weeks.  2.  Previous  section, desires elective repeat.    POSTOPERATIVE DIAGNOSES:  1.  Intrauterine pregnancy at 39 weeks.  2.  Previous  section, desires elective repeat.    PROCEDURE PERFORMED:  Repeat low transverse  section via Pfannenstiel   skin incision.    SURGEON:  Dia Beauchamp MD    ASSISTANT:  Isaac Rubio MD    ANESTHESIOLOGIST:  Isael Georges MD    TYPE OF ANESTHESIA:  Spinal.    INTRAVENOUS FLUIDS:  1.5 mL of LR.    URINE OUTPUT:  50 mL of clear urine at the end of procedure.    ESTIMATED BLOOD LOSS:  500 mL    COMPLICATIONS:  None.    RECOVERY:  Stable to the PACU.    FINDINGS:  Vertex male with clear amniotic fluid.  Apgars 8 and 8, 7 pounds 6   ounces.  Normal uterus, tubes, and ovaries.    RECOVERY:  Stable to the PACU.    DESCRIPTION OF PROCEDURE:  Patient was taken to the operating room where she   received a spinal.  She was then prepped and draped in usual sterile fashion.    She did receive 2 g of Ancef.  A Pfannenstiel skin incision was made with a   scalpel over her previous incision and extended down to the fascia with the   Bovie.  Fascia incised in midline and extended laterally with Fischer scissors.    Inferior aspect of the fascia was grasped with Kocher clamps, elevated, and   tented up.  Fischer scissors were used to separate the rectus from the fascia.    In a similar fashion, the superior aspect of the fascia was grasped with   Kocher clamps, elevated, and tented up.  Fischer scissors separate the rectus   from the fascia.  The midline was identified and entered sharply with   Metzenbaum scissors, extended superiorly and inferiorly.  At this time, the   large Ranjit O was introduced into the pelvis to retract the abdominal wall.    Vesicouterine peritoneum was identified and entered sharply with Metzenbaum   scissors.  Bladder flap was created bluntly.  A low  transverse incision was   made with the scalpel.  Amniotic sac was ruptured and it was clear.  Infant's   head was delivered atraumatically and the rest of the infant delivered without   any problems.  Mouth and nose were bulb suctioned.  Delayed cord clamping was   performed and then the cord was doubly clamped and cut and infant handed over   to awaiting respiratory therapy and L and D nurse team.  At this time, the   placenta was delivered intact, 3-vessel cord was noted.  The uterus was   cleared off all the debris and the hysterotomy was approximated using 0 Vicryl   on a CTX needle without any problems.  Second suture of the same was used to   imbricate the hysterotomy and then the vesicouterine peritoneum was   approximated using 2-0 Vicryl on a CT1 needle without any problems.  The   hysterotomy was then irrigated and found to be hemostatic.  She had normal   tubes and ovaries.  At this time, the hysterotomy was reinspected and was   found to be normal.  The large Ranjit O was removed from the pelvis and at   this time, the peritoneum was approximated using 2-0 Vicryl on a CT1 needle   without any problems.  The rectus muscle was irrigated and found to be   hemostatic and then the fascia was approximated using 0 Vicryl on a CTX needle   without any problems.  Subcutaneous tissue was irrigated and found to be   hemostatic and approximated with 2-0 Vicryl on a CT1 needle, and the skin was   approximated with 4-0 Vicryl on a Justin needle.  Steri-Strips were placed.    Lap and needle counts were correct x2.  The patient was taken out of the OR to   the recovery room in stable condition.       ____________________________________     MD ALICIA BRYAN / PERRY    DD:  10/18/2018 09:53:15  DT:  10/18/2018 10:25:07    D#:  [id number]  Job#:  [id number]    cc: WILFREDO QUINTEROS MD

## 2018-10-18 NOTE — PROGRESS NOTES
0550 - 27 y/o  EDC 10/24/18, EGA 39.1, here to LDA 6 with  Mick. Pt here for scheduled repeat c/s. EFM/TOCO applied, Patient states positive FM. Denies vaginal LOF or Bleeding. VSS.  0600 - IV started, labs collected, pt prepped for surgery, consents signed.   0745 - Dr Beauchamp to bedside to discuss poc.   0840 - Dr Georges to bedside to discuss anesth.  0850 - Pt transferred to OR via ambulation for planned c/s.   0916 - Delivery of viable baby boy, apgar 8/8.   0935 - Baby swaddled and handed to mother.  0952 - Pt transferred to PACU, VSS.   1110 - Pt transferred to PP unit, report given to DAWSON Wen. All questions answered.

## 2018-10-18 NOTE — PROGRESS NOTES
Patient has many family in room for visit. Patient states prn percocet effective with pain 3/10. Infant latching well 8/10. Demonstrated deeper latch. Patient denies pain with latch.

## 2018-10-19 LAB
ERYTHROCYTE [DISTWIDTH] IN BLOOD BY AUTOMATED COUNT: 42.6 FL (ref 35.9–50)
HCT VFR BLD AUTO: 27.7 % (ref 37–47)
HGB BLD-MCNC: 9.2 G/DL (ref 12–16)
MCH RBC QN AUTO: 29.2 PG (ref 27–33)
MCHC RBC AUTO-ENTMCNC: 31.9 G/DL (ref 33.6–35)
MCV RBC AUTO: 91.6 FL (ref 81.4–97.8)
PLATELET # BLD AUTO: 120 K/UL (ref 164–446)
PMV BLD AUTO: 13.4 FL (ref 9–12.9)
RBC # BLD AUTO: 3.08 M/UL (ref 4.2–5.4)
WBC # BLD AUTO: 16 K/UL (ref 4.8–10.8)

## 2018-10-19 PROCEDURE — A6250 SKIN SEAL PROTECT MOISTURIZR: HCPCS | Performed by: OBSTETRICS & GYNECOLOGY

## 2018-10-19 PROCEDURE — A9270 NON-COVERED ITEM OR SERVICE: HCPCS | Performed by: OBSTETRICS & GYNECOLOGY

## 2018-10-19 PROCEDURE — 700102 HCHG RX REV CODE 250 W/ 637 OVERRIDE(OP): Performed by: OBSTETRICS & GYNECOLOGY

## 2018-10-19 PROCEDURE — 770002 HCHG ROOM/CARE - OB PRIVATE (112)

## 2018-10-19 PROCEDURE — 700112 HCHG RX REV CODE 229: Performed by: OBSTETRICS & GYNECOLOGY

## 2018-10-19 PROCEDURE — 700111 HCHG RX REV CODE 636 W/ 250 OVERRIDE (IP)

## 2018-10-19 RX ORDER — HYDROCODONE BITARTRATE AND ACETAMINOPHEN 5; 325 MG/1; MG/1
1-2 TABLET ORAL EVERY 6 HOURS PRN
Status: DISCONTINUED | OUTPATIENT
Start: 2018-10-19 | End: 2018-10-20 | Stop reason: HOSPADM

## 2018-10-19 RX ADMIN — OXYCODONE HYDROCHLORIDE AND ACETAMINOPHEN 1 TABLET: 10; 325 TABLET ORAL at 15:42

## 2018-10-19 RX ADMIN — IBUPROFEN 600 MG: 600 TABLET, FILM COATED ORAL at 15:42

## 2018-10-19 RX ADMIN — OXYCODONE HYDROCHLORIDE AND ACETAMINOPHEN 1 TABLET: 10; 325 TABLET ORAL at 07:42

## 2018-10-19 RX ADMIN — OXYCODONE HYDROCHLORIDE AND ACETAMINOPHEN 1 TABLET: 10; 325 TABLET ORAL at 19:40

## 2018-10-19 RX ADMIN — IBUPROFEN 600 MG: 600 TABLET, FILM COATED ORAL at 21:45

## 2018-10-19 RX ADMIN — SIMETHICONE CHEW TAB 80 MG 80 MG: 80 TABLET ORAL at 15:42

## 2018-10-19 RX ADMIN — OXYCODONE HYDROCHLORIDE AND ACETAMINOPHEN 1 TABLET: 10; 325 TABLET ORAL at 11:24

## 2018-10-19 RX ADMIN — OXYCODONE HYDROCHLORIDE AND ACETAMINOPHEN 1 TABLET: 10; 325 TABLET ORAL at 03:11

## 2018-10-19 RX ADMIN — ONDANSETRON HYDROCHLORIDE 4 MG: 2 INJECTION INTRAMUSCULAR; INTRAVENOUS at 03:22

## 2018-10-19 RX ADMIN — SIMETHICONE CHEW TAB 80 MG 80 MG: 80 TABLET ORAL at 19:40

## 2018-10-19 RX ADMIN — Medication 1 TABLET: at 07:39

## 2018-10-19 RX ADMIN — HYDROCODONE BITARTRATE AND ACETAMINOPHEN 2 TABLET: 5; 325 TABLET ORAL at 23:50

## 2018-10-19 RX ADMIN — DOCUSATE SODIUM 100 MG: 100 CAPSULE, LIQUID FILLED ORAL at 07:39

## 2018-10-19 RX ADMIN — IBUPROFEN 600 MG: 600 TABLET, FILM COATED ORAL at 09:53

## 2018-10-19 RX ADMIN — IBUPROFEN 600 MG: 600 TABLET, FILM COATED ORAL at 03:10

## 2018-10-19 RX ADMIN — SIMETHICONE CHEW TAB 80 MG 80 MG: 80 TABLET ORAL at 07:39

## 2018-10-19 RX ADMIN — Medication 1 TABLET: at 09:49

## 2018-10-19 RX ADMIN — ONDANSETRON HYDROCHLORIDE 4 MG: 2 INJECTION INTRAMUSCULAR; INTRAVENOUS at 08:31

## 2018-10-19 ASSESSMENT — PAIN SCALES - GENERAL
PAINLEVEL_OUTOF10: 8
PAINLEVEL_OUTOF10: 5
PAINLEVEL_OUTOF10: 3
PAINLEVEL_OUTOF10: 2
PAINLEVEL_OUTOF10: 7
PAINLEVEL_OUTOF10: 9
PAINLEVEL_OUTOF10: 2
PAINLEVEL_OUTOF10: 6
PAINLEVEL_OUTOF10: 5
PAINLEVEL_OUTOF10: 5
PAINLEVEL_OUTOF10: 6
PAINLEVEL_OUTOF10: 5

## 2018-10-19 ASSESSMENT — EDINBURGH POSTNATAL DEPRESSION SCALE (EPDS)
I HAVE BLAMED MYSELF UNNECESSARILY WHEN THINGS WENT WRONG: NO, NEVER
THE THOUGHT OF HARMING MYSELF HAS OCCURRED TO ME: NEVER
I HAVE BEEN SO UNHAPPY THAT I HAVE HAD DIFFICULTY SLEEPING: NOT AT ALL
THINGS HAVE BEEN GETTING ON TOP OF ME: NO, I HAVE BEEN COPING AS WELL AS EVER
I HAVE BEEN ANXIOUS OR WORRIED FOR NO GOOD REASON: HARDLY EVER
I HAVE LOOKED FORWARD WITH ENJOYMENT TO THINGS: AS MUCH AS I EVER DID
I HAVE BEEN SO UNHAPPY THAT I HAVE BEEN CRYING: NO, NEVER
I HAVE FELT SCARED OR PANICKY FOR NO GOOD REASON: NO, NOT AT ALL
I HAVE FELT SAD OR MISERABLE: NO, NOT AT ALL
I HAVE BEEN ABLE TO LAUGH AND SEE THE FUNNY SIDE OF THINGS: AS MUCH AS I ALWAYS COULD

## 2018-10-19 ASSESSMENT — PATIENT HEALTH QUESTIONNAIRE - PHQ9
2. FEELING DOWN, DEPRESSED, IRRITABLE, OR HOPELESS: NOT AT ALL
1. LITTLE INTEREST OR PLEASURE IN DOING THINGS: NOT AT ALL
SUM OF ALL RESPONSES TO PHQ9 QUESTIONS 1 AND 2: 0

## 2018-10-19 NOTE — CARE PLAN
Problem: Altered physiologic condition related to postoperative  delivery  Goal: Patient physiologically stable as evidenced by normal lochia, palpable uterine involution and vital signs within normal limits  Outcome: PROGRESSING AS EXPECTED  Fundus firm @ U, lochia rubra minimal. Surgical dressing CDI. V/S stable.     Problem: Potential for postpartum infection related to surgical incision, compromised uterine condition, urinary tract or respiratory compromise  Goal: Patient will be afebrile and free from signs and symptoms of infection  Outcome: PROGRESSING AS EXPECTED  Patient has no S/S of infection noted @ this time.

## 2018-10-19 NOTE — LACTATION NOTE
This note was copied from a baby's chart.  Mother latching baby and reports that feeding at breast is going well. Latch improved today, baby staying awake longer and she is pulling baby in closer. Resources post discharge discussed.

## 2018-10-19 NOTE — PROGRESS NOTES
Received report from night RN. Infant at bedside in open crib no signs of distress. Pt resting in bed, discussed plan of care and pain management for the day. Pt states she will call staff if she requires pain interventions or assistance through the day. PRN medication administered per MAR. No further needs at this time.

## 2018-10-19 NOTE — CARE PLAN
Problem: Venous Thromboembolism (VTW)/Deep Vein Thrombosis (DVT) Prevention:  Goal: Patient will participate in Venous Thrombosis (VTE)/Deep Vein Thrombosis (DVT)Prevention Measures  Outcome: PROGRESSING AS EXPECTED  Pt ambulatory. No current s/s of DVT.    Problem: Altered physiologic condition related to postoperative  delivery  Goal: Patient physiologically stable as evidenced by normal lochia, palpable uterine involution and vital signs within normal limits  Outcome: PROGRESSING AS EXPECTED  Fundus firm, lochia light.

## 2018-10-19 NOTE — CONSULTS
Lactation note:  Initial visit.  Discussed normal  behaviors and normal course of breastfeeding at 12-24-48-72 hours, and what to expect. Discussed importance of offering breast every 2-3 hours, and even if infant shows no interest, can do hand expression into infant's lips. Encouraged to continue doing skin to skin. Discussed signs of a good latch, voiding and stooling patterns, feeding cues, stomach size, and importance of establishing milk supply with frequency of feedings.  New Beginning booklet given, and breastfeeding content reviewed.   Plan for tonight is to continue to offer breast first, if not latching well, can hand express colostrum, and refeed by spoon.    MOB expressed concern regarding infant fussiness, encouraged skin to skin, and less use of the pacifier.   Observed MOB  Attempting to latch infant on the left side via cradle hold. Infant with narrow lips, only on the nipple, non-nutritive sucking noted. Encouraged MOB to work on a deeper latch, to ensure adequate milk transfer. However, MOB denies pain with latch. Encouraged her to continue to work on deep latch, and skin 2 skin, with hand expression.     Information given regarding [facility], their number to call, and invited to breastfeeding circles.

## 2018-10-19 NOTE — PROGRESS NOTES
Bedside report done, patient in bed socializing with visitors. FOB @ bedside. Will continue to monitor.

## 2018-10-19 NOTE — PROGRESS NOTES
Patient denies prn pain medication upon re-assessment. Patient states nausea is gone. Patient continues to ambulate around her room and standing next to bed.

## 2018-10-19 NOTE — PROGRESS NOTES
"POD #1 s/p RLTCS via Pfannenstiel skin incision.    S:  Doing well.  Pain controlled.  Moderate lochia.  Breast feeding.  Ambulating.  Tolerating a regular diet.  No nausea/vomiting/fevers/chills/night sweats.    O:  Blood pressure 123/89, pulse 96, temperature 36.6 °C (97.9 °F), resp. rate 18, height 1.575 m (5' 2\"), weight 69.4 kg (153 lb), last menstrual period 12/22/2017, SpO2 96 %, currently breastfeeding.    A, A, and O x 3 NAD    FF u/1    Incision: clean/dry/intact    No c/c/e    Recent Labs      10/18/18   0600  10/18/18   2307   WBC  12.6*  16.0*   RBC  4.02*  3.08*   HEMOGLOBIN  12.1  9.2*   HEMATOCRIT  36.2*  27.7*   MCV  90.0  91.6   MCH  30.1  29.2   RDW  42.4  42.6   PLATELETCT  127*  120*   MPV  13.9*  13.4*   NEUTSPOLYS  73.70*   --    LYMPHOCYTES  17.90*   --    MONOCYTES  6.60   --    EOSINOPHILS  0.80   --    BASOPHILS  0.20   --      A/P: POD #1 s/p RLTCS.    1.  Mild blood loss anemia - begin iron supplementation.  2.  InterDry to incision.  3.  Ambulate TID.  4.  ADAT.  5.  Continue breast feeding.    "

## 2018-10-20 VITALS
HEIGHT: 62 IN | DIASTOLIC BLOOD PRESSURE: 88 MMHG | TEMPERATURE: 97.9 F | OXYGEN SATURATION: 96 % | BODY MASS INDEX: 28.16 KG/M2 | SYSTOLIC BLOOD PRESSURE: 133 MMHG | RESPIRATION RATE: 18 BRPM | HEART RATE: 101 BPM | WEIGHT: 153 LBS

## 2018-10-20 PROBLEM — N85.A UTERINE SCAR FROM PREVIOUS CESAREAN DELIVERY: Status: ACTIVE | Noted: 2018-10-20

## 2018-10-20 PROBLEM — D62 ANEMIA ASSOCIATED WITH ACUTE BLOOD LOSS: Status: ACTIVE | Noted: 2018-10-20

## 2018-10-20 PROCEDURE — A9270 NON-COVERED ITEM OR SERVICE: HCPCS | Performed by: OBSTETRICS & GYNECOLOGY

## 2018-10-20 PROCEDURE — 700102 HCHG RX REV CODE 250 W/ 637 OVERRIDE(OP): Performed by: OBSTETRICS & GYNECOLOGY

## 2018-10-20 RX ORDER — IBUPROFEN 600 MG/1
600 TABLET ORAL EVERY 6 HOURS PRN
Qty: 20 TAB | Refills: 1 | Status: SHIPPED | OUTPATIENT
Start: 2018-10-20 | End: 2021-02-19

## 2018-10-20 RX ORDER — HYDROCODONE BITARTRATE AND ACETAMINOPHEN 5; 325 MG/1; MG/1
1-2 TABLET ORAL EVERY 6 HOURS PRN
Qty: 25 TAB | Refills: 0 | Status: SHIPPED | OUTPATIENT
Start: 2018-10-20 | End: 2018-10-24

## 2018-10-20 RX ORDER — ACETAMINOPHEN 325 MG/1
325-650 TABLET ORAL EVERY 4 HOURS PRN
Qty: 30 TAB | Refills: 0 | COMMUNITY
Start: 2018-10-20 | End: 2020-02-03

## 2018-10-20 RX ORDER — PSEUDOEPHEDRINE HCL 30 MG
100 TABLET ORAL 2 TIMES DAILY PRN
Qty: 60 CAP | COMMUNITY
Start: 2018-10-20 | End: 2020-02-03

## 2018-10-20 RX ADMIN — HYDROCODONE BITARTRATE AND ACETAMINOPHEN 2 TABLET: 5; 325 TABLET ORAL at 03:45

## 2018-10-20 RX ADMIN — Medication 1 TABLET: at 08:08

## 2018-10-20 RX ADMIN — Medication 1 TABLET: at 06:00

## 2018-10-20 RX ADMIN — IBUPROFEN 600 MG: 600 TABLET, FILM COATED ORAL at 03:45

## 2018-10-20 RX ADMIN — SIMETHICONE CHEW TAB 80 MG 80 MG: 80 TABLET ORAL at 03:45

## 2018-10-20 ASSESSMENT — PAIN SCALES - GENERAL
PAINLEVEL_OUTOF10: 7
PAINLEVEL_OUTOF10: 3
PAINLEVEL_OUTOF10: 4

## 2018-10-20 NOTE — DISCHARGE INSTRUCTIONS
POSTPARTUM DISCHARGE INSTRUCTIONS FOR MOM    YOB: 1990   Age: 28 y.o.               Admit Date: 10/18/2018     Discharge Date: 10/20/2018  Attending Doctor:  Dia Beauchamp M.D.                  Allergies:  Sulfa drugs    Discharged to home by car. Discharged via walking, hospital escort: Yes.  Special equipment needed: Not Applicable  Belongings with: Personal  Be sure to schedule a follow-up appointment with your primary care doctor or any specialists as instructed.     Discharge Plan:   Diet Plan: Discussed  Activity Level: Discussed  Confirmed Follow up Appointment: Patient to Call and Schedule Appointment  Confirmed Symptoms Management: Discussed  Medication Reconciliation Updated: Yes  Influenza Vaccine Indication: Patient Refuses    REASONS TO CALL YOUR OBSTETRICIAN:  1.   Persistent fever or shaking chills (Temperature higher than 100.4)  2.   Heavy bleeding (soaking more than 1 pad per hour); Passing clots  3.   Foul odor from vagina  4.   Mastitis (Breast infection; breast pain, chills, fever, redness)  5.   Urinary pain, burning or frequency  6.   Episiotomy infection  7.   Abdominal incision infection  8.   Severe depression longer than 24 hours    HAND WASHING  · Prior to handling the baby.  · Before breastfeeding or bottle feeding baby.  · After using the bathroom or changing the baby's diaper.    WOUND CARE  Ask your physician for additional care instructions.  In general:    ·  Incision:      · Keep clean and dry.    · Do NOT lift anything heavier than your baby for up to 6 weeks.    · There should not be any opening or pus.      VAGINAL CARE  · Nothing inside vagina for 6 weeks: no sexual intercourse, tampons or douching.  · Bleeding may continue for 2-4 weeks.  Amount may vary.    · Call your physician for heavy bleeding which means soaking more than 1 pad per hour    BIRTH CONTROL  · It is possible to become pregnant at any time after delivery and while  "breastfeeding.  · Plan to discuss a method of birth control with your physician at your follow up visit. visit.    DIET AND ELIMINATION  · Eating more fiber (bran cereal, fruits, and vegetables) and drinking plenty of fluids will help to avoid constipation.  · Urinary frequency after childbirth is normal.    POSTPARTUM BLUES  During the first few days after birth, you may experience a sense of the \"blues\" which may include impatience, irritability or even crying.  These feeling come and go quickly.  However, as many as 1 in 10 women experience emotional symptoms known as postpartum depression.    Postpartum depression:  May start as early as the second or third day after delivery or take several weeks or months to develop.  Symptoms of \"blues\" are present, but are more intense:  Crying spells; loss of appetite; feelings of hopelessness or loss of control; fear of touching the baby; over concern or no concern at all about the baby; little or no concern about your own appearance/caring for yourself; and/or inability to sleep or excessive sleeping.  Contact your physician if you are experiencing any of these symptoms.    Crisis Hotline:  · [place] Crisis Hotline:  1-106-JLMYAME  Or 1-559.957.5543  · [place] Crisis Hotline:  1-569.744.3980  Or 872-100-6282    PREVENTING SHAKEN BABY:  If you are angry or stressed, PUT THE BABY IN THE CRIB, step away, take some deep breaths, and wait until you are calm to care for the baby.  DO NOT SHAKE THE BABY.  You are not alone, call a supporter for help.    · Crisis Call Center 24/7 crisis line 049-697-4762 or 1-623.344.4283  · You can also text them, text \"ANSWER\" to 334514    QUIT SMOKING/TOBACCO USE:  I understand the use of any tobacco products increases my chance of suffering from future heart disease and could cause other illnesses which may shorten my life. Quitting the use of tobacco products is the single most important thing I can do to improve my health. For further " information on smoking / tobacco cessation call a Toll Free Quit Line at 1-813.650.5667 (*[employer] [place]) or 1-167.257.9710 (American Lung Association) or you can access the web based program at www.lungusa.org.    · [place] Tobacco Users Help Line:  (782) 685-2420       Toll Free: 1-391.396.8322  · Quit Tobacco Program [facility] (013)812-4255    DEPRESSION / SUICIDE RISK:  As you are discharged from this [facility], it is important to learn how to keep safe from harming yourself.    Recognize the warning signs:  · Abrupt changes in personality, positive or negative- including increase in energy   · Giving away possessions  · Change in eating patterns- significant weight changes-  positive or negative  · Change in sleeping patterns- unable to sleep or sleeping all the time   · Unwillingness or inability to communicate  · Depression  · Unusual sadness, discouragement and loneliness  · Talk of wanting to die  · Neglect of personal appearance   · Rebelliousness- reckless behavior  · Withdrawal from people/activities they love  · Confusion- inability to concentrate     If you or a loved one observes any of these behaviors or has concerns about self-harm, here's what you can do:  · Talk about it- your feelings and reasons for harming yourself  · Remove any means that you might use to hurt yourself (examples: pills, rope, extension cords, firearm)  · Get professional help from the community (Mental Health, Substance Abuse, psychological counseling)  · Do not be alone:Call your Safe Contact- someone whom you trust who will be there for you.  · Call your local CRISIS HOTLINE 786-3251 or 643-171-5222  · Call your local Children's Mobile Crisis Response Team [place] (573) 869-5015 or www.[employer]  · Call the toll free National Suicide Prevention Hotlines   · National Suicide Prevention Lifeline 963-245-SYIR (8104)  · National Hope Line Network 800-SUICIDE  (896-4845)    DISCHARGE SURVEY:  Thank you for choosing [facility].  We hope we provided you with very good care.  You may be receiving a survey in the mail.  Please fill it out.  Your opinion is valuable to us.    ADDITIONAL EDUCATIONAL MATERIALS GIVEN TO PATIENT:        My signature on this form indicates that:  1.  I have reviewed and understand the above information  2.  My questions regarding this information have been answered to my satisfaction.  3.  I have formulated a plan with my discharge nurse to obtain my prescribed medication for home.

## 2018-10-20 NOTE — PROGRESS NOTES
Patient educated when to call md for post delivery hemmorage. Patient education to feed every 2-3 hours on demand. Patient educated to call for follow up appointment in 1 weeks at Obstetrician. Patient received discharge education and denies self care questions.

## 2018-10-20 NOTE — PROGRESS NOTES
Discharge instructions reviewed and signed; copy given to patient and placed in chart. Opiate consent reviewed and signed. F/U appointment discussed. Patient verbalizes understanding.

## 2018-10-20 NOTE — PROGRESS NOTES
1900: Report received from Evelin OSMAN. Patient stable.    2000:Assessment complete. Uterine fundus is firm, lochia is light. VSS and WDL. Tolerating diet. Voiding without difficulty. Tolerating ambulation well. Incision is healing well with steri strips open to air. Pain controlled with prn medication (see MAR). Will offer scheduled pain medications as they become available. Bonding well with baby. FOB at bedside. All questions and concerns regarding POC are addressed. Call light within reach and patient encouraged to call for additional needs.    2200:  Patient medicated for pain. No other needs at this time.    2350: Patient states that the percocet aren't helping and that with her first child she took vicodin and it helped manage her pain better. Dr. Maria called and orders received for 1-2 tablets of Norco q4 hour PRN. Patient medicated with Norco 10mg and Motrin 600.    0200: Patient holding infant. No needs at this time.    0400:Patient breastfeeding with good latch. Medicated for pain. No other needs at this time.    0600: Patient given Iron supplement.

## 2018-10-20 NOTE — CARE PLAN
Problem: Altered physiologic condition related to postoperative  delivery  Goal: Patient physiologically stable as evidenced by normal lochia, palpable uterine involution and vital signs within normal limits  Outcome: PROGRESSING AS EXPECTED  Uterus firm, lochia light, pain controlled with PRN medications (see MAR). Lab values WDL for postpartum state. Cold/heat packs, extra blankets/pillows offered. VSS and WDL.     Problem: Potential for postpartum infection related to surgical incision, compromised uterine condition, urinary tract or respiratory compromise  Goal: Patient will be afebrile and free from signs and symptoms of infection  Outcome: PROGRESSING AS EXPECTED  No signs or symptoms of infection. Incision healing well. VSS and WDL.

## 2018-10-20 NOTE — PROGRESS NOTES
POD 2---      Afebrile, VS normal, UO adequate  + flatus, tolerating regular diet well, denies N/V    LAB:      Results for ALBERTO CABRERA (MRN [id number]) as of 10/20/2018 06:26   10/18/2018 06:00 10/18/2018 23:07   WBC 12.6 (H) 16.0 (H)   Hemoglobin 12.1 9.2 (L)   Hematocrit 36.2 (L) 27.7 (L)   Platelet Count 127 (L) 120 (L)       PE:     Lungs clear to auscultation  Abdomen soft, flat, bowel sounds present and normal  Wound clean, dry, intact, Calves nontender, Jonas sign negative bilaterally  Back:  No CVA tenderness     PLAN: Postop care, analgesia as needed, diet as tolerated,  activity as tolerated. Patient planning on discharge: today.    Prescriptions:   Norco 5/325 #25, ibuprofen, PNV, Folitab 500 daily #20  Followup plans:   1 week .

## 2018-10-20 NOTE — CARE PLAN
Problem: Potential anxiety related to difficulty adapting to parental role  Goal: Patient will verbalize and demonstrate effective bonding and parenting behavior    Intervention: Provide emotional support and encouragement to patient and family  Patient bright and cheerful to discharge. Fob in room for assistance. Patient denies breastfeeding assistance. Reminded to continue taking prn pain medication upon assessment. Patient states pain 3/10. Patient anxious to leave. Patient ambulating at will without assistance.

## 2018-11-05 NOTE — DISCHARGE SUMMARY
DATE OF ADMISSION:  10/18/2018    DATE OF DISCHARGE:  10/20/2018    ADMITTING DIAGNOSES:  1.  Intrauterine pregnancy at 39 weeks.  2.  Previous  section, desires elective repeat.    PROCEDURES:  Repeat low transverse  section via Pfannenstiel skin   incision and routine postop care.    HOSPITAL COURSE:  This patient is a 28-year-old  2, now para 2    female who underwent a repeat  section on 10/18/2018.  The   patient had a vertex male with Apgars 8 and 8, 7 pounds 6 ounces.    Postoperatively, patient did great.  By postop day #1, she was tolerating   clear liquid diet, ambulating and voiding.  She was nursing the baby without   difficulty.  Her vitals were stable.  Her physical exam was normal including   her wound, which was clean, dry, and intact.  Her preop H and H was 12.1 and   36.2 and postop H and H 9.2 and 27.7.  By postop day #2, the patient continued   to do well.  She was tolerating a regular diet, voiding and ambulating.  She   was nursing the baby.  She was passing flatus.  No nausea, no vomiting and   good pain control.  Her physical exam was normal including her vitals.  On   postop day #2, the patient was discharged home, hemodynamically stable with   baby.  She was discharged home on [place] 5/325 one p.o. q. 4-6 hours p.r.n. for   pain #25, ibuprofen, prenatal vitamin and [place] tab.  Patient to see me back   in a week to 2.  She was to continue with pelvic rest and no heavy lifting.       ____________________________________     MD ALICIA BRYAN / PERRY    DD:  2018 18:43:56  DT:  2018 20:00:17    D#:  [id number]  Job#:  [id number]

## 2019-01-28 ENCOUNTER — OFFICE VISIT (OUTPATIENT)
Dept: MEDICAL GROUP | Facility: PHYSICIAN GROUP | Age: 29
End: 2019-01-28
Payer: COMMERCIAL

## 2019-01-28 VITALS
RESPIRATION RATE: 16 BRPM | BODY MASS INDEX: 24.88 KG/M2 | SYSTOLIC BLOOD PRESSURE: 102 MMHG | HEIGHT: 62 IN | HEART RATE: 76 BPM | DIASTOLIC BLOOD PRESSURE: 58 MMHG | OXYGEN SATURATION: 97 % | WEIGHT: 135.2 LBS | TEMPERATURE: 98.1 F

## 2019-01-28 DIAGNOSIS — Z00.00 ENCOUNTER FOR PREVENTATIVE ADULT HEALTH CARE EXAMINATION: ICD-10-CM

## 2019-01-28 PROBLEM — N85.A UTERINE SCAR FROM PREVIOUS CESAREAN DELIVERY: Status: RESOLVED | Noted: 2018-10-20 | Resolved: 2019-01-28

## 2019-01-28 PROBLEM — Z30.09 FAMILY PLANNING: Status: RESOLVED | Noted: 2017-11-01 | Resolved: 2019-01-28

## 2019-01-28 PROBLEM — D62 ANEMIA ASSOCIATED WITH ACUTE BLOOD LOSS: Status: RESOLVED | Noted: 2018-10-20 | Resolved: 2019-01-28

## 2019-01-28 PROCEDURE — 99395 PREV VISIT EST AGE 18-39: CPT | Performed by: FAMILY MEDICINE

## 2019-01-28 RX ORDER — PROPRANOLOL HYDROCHLORIDE 40 MG/1
40 TABLET ORAL DAILY
COMMUNITY
End: 2019-08-09

## 2019-01-28 ASSESSMENT — PATIENT HEALTH QUESTIONNAIRE - PHQ9: CLINICAL INTERPRETATION OF PHQ2 SCORE: 0

## 2019-01-29 NOTE — PROGRESS NOTES
Subjective:     CC:   Chief Complaint   Patient presents with   • Annual Exam       HPI:   Sakshi Gibbs is a 28 y.o. female who presents for annual exam. She is feeling well and denies any complaints. She has questions about restarting her Adderall.    Patient has GYN provider: Yes  Last pap: 3/2018, up to date  Last mammo: N/A  Last colonoscopy: N/A  Last bone density test: N/A  Last Tdap: 10/2018, up to date  Gardiasil: Aged out  Hx. STD's: No  Birth control: Paragard IUD    Menses are irregular as patient is breastfeeding.   Cramping is mild.   She does not take OTC analgesics for cramps.  No significant bloating/fluid retention, pelvic pain, or dyspareunia. No vaginal discharge.  No breast tenderness, mass, nipple discharge, changes in size or contour, or abnormal cyclic discomfort.  She does not perform regular self breast exams.  Regular exercise: Yes   Diet: Healthy    She  has a past medical history of ADHD and Migraine.  She  has a past surgical history that includes primary c section (09/20/2013); ankle arthroscopy (Left, 2013); and repeat c section (N/A, 10/18/2018).    History reviewed. No pertinent family history.    Social History     Social History   • Marital status:      Spouse name: N/A   • Number of children: N/A   • Years of education: N/A     Occupational History   • Not on file.     Social History Main Topics   • Smoking status: Never Smoker   • Smokeless tobacco: Never Used   • Alcohol use Yes      Comment: soc   • Drug use: No   • Sexual activity: Yes     Partners: Male     Birth control/ protection: IUD     Other Topics Concern   • Not on file     Social History Narrative   • No narrative on file       Patient Active Problem List    Diagnosis Date Noted   • Attention deficit hyperactivity disorder (ADHD), predominantly inattentive type 11/01/2017   • Migraine without aura and without status migrainosus, not intractable 11/01/2017   • Recurrent cold sores 11/01/2017  "        Current Outpatient Prescriptions   Medication Sig Dispense Refill   • IUD'S IU by Intrauterine route.     • propranolol (INDERAL) 40 MG Tab Take 40 mg by mouth every day.     • valACYclovir (VALTREX) 500 MG Tab Take 1 Tab by mouth 2 times a day. 180 Tab 3   • Prenatal MV-Min-Fe Fum-FA-DHA (PRENATAL 1 PO) Take  by mouth.     • acetaminophen (TYLENOL) 325 MG Tab Take 1-2 Tabs by mouth every four hours as needed for Mild Pain or Moderate Pain. 30 Tab 0   • docusate sodium 100 MG Cap Take 100 mg by mouth 2 times a day as needed for Constipation. 60 Cap    • ibuprofen (MOTRIN) 600 MG Tab Take 1 Tab by mouth every 6 hours as needed for Mild Pain or Moderate Pain. 20 Tab 1     No current facility-administered medications for this visit.      Allergies   Allergen Reactions   • Sulfa Drugs      Review of Systems   Constitutional: Negative for fever, chills and malaise/fatigue.   HENT: Negative for congestion.    Eyes: Negative for pain.   Respiratory: Negative for cough and shortness of breath.    Cardiovascular: Negative for leg swelling.   Gastrointestinal: Negative for nausea, vomiting, abdominal pain and diarrhea.   Genitourinary: Negative for dysuria and hematuria.   Skin: Negative for rash.   Neurological: Negative for dizziness, focal weakness and headaches.   Endo/Heme/Allergies: Does not bruise/bleed easily.   Psychiatric/Behavioral: Negative for depression.  The patient is not nervous/anxious.      Objective:     /58   Pulse 76   Temp 36.7 °C (98.1 °F)   Resp 16   Ht 1.575 m (5' 2\")   Wt 61.3 kg (135 lb 3.2 oz)   SpO2 97%   Breastfeeding? Yes   BMI 24.73 kg/m²   Body mass index is 24.73 kg/m².  Wt Readings from Last 4 Encounters:   01/28/19 61.3 kg (135 lb 3.2 oz)   10/18/18 69.4 kg (153 lb)   12/29/17 54.9 kg (121 lb)   11/01/17 54.9 kg (121 lb)     Physical Exam:  Constitutional: Well-developed and well-nourished. Not diaphoretic. No distress.   Skin: Skin is warm and dry. No rash " noted.  Head: Atraumatic without lesions.  Eyes: Conjunctivae and extraocular motions are normal. Pupils are equal, round, and reactive to light. No scleral icterus.   Ears:  External ears unremarkable. Tympanic membranes clear and intact.  Nose: Nares patent. Septum midline. Turbinates without erythema nor edema. No discharge.   Mouth/Throat: Dentition is good. Tongue normal. Oropharynx is clear and moist. Posterior pharynx without erythema or exudates.  Neck: Supple, trachea midline. Normal range of motion. No thyromegaly present. No lymphadenopathy--cervical or supraclavicular.  Cardiovascular: Regular rate and rhythm, S1 and S2 without murmur, rubs, or gallops.    Abdomen: Soft, non tender, and without distention. Active bowel sounds in all four quadrants. No rebound, guarding, masses or HSM.  Extremities: No cyanosis, clubbing, erythema, nor edema. Distal pulses intact and symmetric.   Musculoskeletal: All major joints AROM full in all directions without pain.  Neurological: Grossly non-focal. No cranial nerve deficit. 5/5 myotomes. Sensation intact. Negative Rhomberg.  Psychiatric:  Behavior, mood, and affect are appropriate.    Assessment and Plan:     1. Encounter for preventative adult health care examination  This is a healthy 28-year-old female here today for a preventative exam.  Previous medical history, healthcare maintenance and immunization status reviewed.  Patient is up to date.  Advised no Adderall while breastfeeding. See discussion of anticipatory guidance below.  Patient will return annually for preventative exams.     HCM:  Up to date.   Labs per orders.  Immunizations per orders.  Patient counseled about skin care, diet, supplements, prenatal vitamins, safe sex and exercise.    Follow-up: Return in about 3 months (around 4/28/2019) for f/u ADHD, short.

## 2019-03-22 ENCOUNTER — OFFICE VISIT (OUTPATIENT)
Dept: MEDICAL GROUP | Facility: PHYSICIAN GROUP | Age: 29
End: 2019-03-22
Payer: COMMERCIAL

## 2019-03-22 VITALS
HEIGHT: 62 IN | RESPIRATION RATE: 16 BRPM | DIASTOLIC BLOOD PRESSURE: 72 MMHG | TEMPERATURE: 98.1 F | SYSTOLIC BLOOD PRESSURE: 92 MMHG | HEART RATE: 60 BPM | BODY MASS INDEX: 25.21 KG/M2 | OXYGEN SATURATION: 100 % | WEIGHT: 137 LBS

## 2019-03-22 DIAGNOSIS — J04.0 LARYNGITIS: ICD-10-CM

## 2019-03-22 DIAGNOSIS — N64.0 NIPPLE FISSURE: ICD-10-CM

## 2019-03-22 PROCEDURE — 99213 OFFICE O/P EST LOW 20 MIN: CPT | Performed by: PHYSICIAN ASSISTANT

## 2019-03-22 ASSESSMENT — PAIN SCALES - GENERAL: PAINLEVEL: NO PAIN

## 2019-03-27 ENCOUNTER — HOSPITAL ENCOUNTER (OUTPATIENT)
Dept: LACTATION | Facility: MEDICAL CENTER | Age: 29
End: 2019-03-27

## 2019-03-27 NOTE — LACTATION NOTE
10/18/2018: 7# 6oz  03/20/2019: 14# 8oz  03/27/2019:  14#.12.7oz, consistent percentile from 3/20/19    CC: Nipple pain and supply    History: Prior breastfeeding experience and back to work noted supply fall.  Ample supply in the beginning with this baby, removing milk with the [place]. Returned to work and pumping once at noon for 6-7 ounces.  Adding in [place] milk of 3oz for 10oz in 8+ hours.   needing to use formula every 1-2 days following his hunger cues. Nipples now have a tear in them, they are dry and flaky, she was concerned about yeast, he does a lot of pulling.  Mom using lanolin in the day, neosporin at night.  Slight sensitivity to wool.  Sleeps in his crib once for 8 hours other times waking up frequently, no routine found.  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 Has introduced cereal once per day very thinly prepared.  Typical feeding at  can be 5 oz.  Mom is not feeling well today, drinking pedialyte, not eating well, exhausted.     Assessment: Well developed healthy baby boy.  Tongue extension over the lip. Content to lay and smile initially. Mom latched independently and he removed 2.7oz from the left breast. Nipples are irritated, no sign of infection. To right breast and removed 1.2oz, total intake 4oz. Efficient eater then spent the last 2 minutes pulling and playing for milk, not upset.  Dressed and to car seat content with the 4ounces.    Treatment plan:  Supply has decreased from decreased stimulation with work, Toney is pulling and tugging to make more milk but has resulted in damaging the nipple.  Supply is not profoundly decreased but enoughtfor a baby who used to gulp his food.  Treatment is to increase supply with increased frequency of stimulation.   Nurse on demand as you would while on break, however if hurting stop and offer an ounce of formula - 1-2 times per day.  Consider reglan 10mg every 8 hours as a trial to increase supply.  No more  than a 2 week supply, update me after 5 days on response.  Back to work stimulation/pumping has to increase to preserve supply.  [place], [place] and [place] are all options to explore for pumping. Pumping on the way to  and after the kids leave before you leave to again add stimulations.    Magic number article emailed.   Pregnancy test although has copper IUD but nipples dry and supply decreased  Follow up to evaluate supply at the end of next week before returning to work.

## 2019-03-29 NOTE — PROGRESS NOTES
"Chief Complaint   Patient presents with   • Laryngitis     since 3/20/19   • Thrush   • Breast Problem     \"cracking\"        HISTORY OF PRESENT ILLNESS: Sakshi Gibbs is an established 28 y.o. female here to discuss the evaluation and management of:    Patient is a pleasant 28-year-old female here today to discuss possible thrush and laryngitis.  She tells me that is currently nursing and her son is 5 months old.  States she is experiencing itching, redness, dry skin, and cracking around her bilateral nipples.  Denies warmth or discharge.  States her son does not have thrush.  She tells me that she has been using lanolin with no improvement of symptoms.  She also mentions that she is been using over-the-counter Lotrimin for the past 2 days with minimal improvement.  States she feels that her son is getting adequate milk supply.  Patient is inquiring about treatment options.    She also tells me 3 days ago she developed rhinorrhea, nasal congestion, sore throat, postnasal drip, nonproductive cough, bilateral ear fullness, mild sinus pressure, headache similar to past headaches, and hoarseness of voice.  States she has not used over-the-counter at home treatments alleviate symptoms.  Patient is inquiring about treatment options.        Patient Active Problem List    Diagnosis Date Noted   • Attention deficit hyperactivity disorder (ADHD), predominantly inattentive type 11/01/2017   • Migraine without aura and without status migrainosus, not intractable 11/01/2017   • Recurrent cold sores 11/01/2017       Allergies:Sulfa drugs    Current Outpatient Prescriptions   Medication Sig Dispense Refill   • IUD'S IU by Intrauterine route.     • propranolol (INDERAL) 40 MG Tab Take 40 mg by mouth every day.     • Prenatal MV-Min-Fe Fum-FA-DHA (PRENATAL 1 PO) Take  by mouth.     • acetaminophen (TYLENOL) 325 MG Tab Take 1-2 Tabs by mouth every four hours as needed for Mild Pain or Moderate Pain. 30 Tab 0   • docusate " sodium 100 MG Cap Take 100 mg by mouth 2 times a day as needed for Constipation. 60 Cap    • ibuprofen (MOTRIN) 600 MG Tab Take 1 Tab by mouth every 6 hours as needed for Mild Pain or Moderate Pain. 20 Tab 1   • valACYclovir (VALTREX) 500 MG Tab Take 1 Tab by mouth 2 times a day. 180 Tab 3     No current facility-administered medications for this visit.        Social History   Substance Use Topics   • Smoking status: Never Smoker   • Smokeless tobacco: Never Used   • Alcohol use Yes      Comment: soc       Family Status   Relation Status   • Son Alive   • Kathryn Alive     Family History   Problem Relation Age of Onset   • No Known Problems Son    • No Known Problems Daughter        ROS:  Review of Systems   Constitutional: Negative for fever, chills, weight loss and malaise/fatigue.   HENT: Negative for ear pain, nosebleeds, and neck pain.  + for nasal congestion, rhinorrhea, postnasal drip, sore throat, hoarseness of voice, bilateral ear fullness, mild sinus pressure.  Eyes: Negative for blurred vision.   Respiratory: Negative for cough, sputum production, shortness of breath and wheezing.    Cardiovascular: Negative for chest pain, palpitations, orthopnea and leg swelling.   Gastrointestinal: Negative for heartburn, nausea, vomiting and abdominal pain.   Genitourinary: Negative for dysuria, urgency and frequency.   Musculoskeletal: Negative for myalgias, back pain and joint pain.   Skin: Negative for rash and itching.   Neurological: Negative for dizziness, tingling, tremors, sensory change, focal weakness. + for headaches.   Endo/Heme/Allergies: Does not bruise/bleed easily.   Psychiatric/Behavioral: Negative for depression, suicidal ideas and memory loss.  The patient is not nervous/anxious and does not have insomnia.    All other systems reviewed and are negative except as in HPI.  Breast: Positive for bilateral nipple pain.  Bilaterally symmetrical, no nipple discharge, no skin changes or dimpling are  noted.   "Both breasts are free of palpable pathology and the axillas are free of lymphadenopathy.      Exam: Blood pressure (!) 92/72, pulse 60, temperature 36.7 °C (98.1 °F), temperature source Temporal, resp. rate 16, height 1.575 m (5' 2\"), weight 62.1 kg (137 lb), SpO2 100 %, not currently breastfeeding. Body mass index is 25.06 kg/m².  General: Normal appearing. No distress.  HEENT: Normocephalic. Eyes conjunctiva clear lids without ptosis, pupils equal and reactive to light accommodation, ears normal shape and contour, canals are clear bilaterally, tympanic membranes are benign, nasal mucosa erythematous and boggy, oropharynx is without erythema, edema or exudates.   Neck: Supple without JVD or bruit. Thyroid is not enlarged.  Pulmonary: Clear to ausculation.  Normal effort. No rales, ronchi, or wheezing.  Cardiovascular: Regular rate and rhythm without murmur.   Abdomen: Soft, nontender, nondistended. Normal bowel sounds. Liver and spleen are not palpable  Neurologic: Grossly nonfocal.  Cranial nerves are normal.  Lymph: No cervical, supraclavicular or axillary lymph nodes are palpable  Skin: Warm and dry.  No rashes or suspicious skin lesions.  Musculoskeletal: Normal gait. No extremity cyanosis, clubbing, or edema.  Psych: Normal mood and affect. Alert and oriented x3. Judgment and insight is normal.  Breast: Positive for dry/cracking skin around bilateral nipples.  Negative for discharge/erythema/warmth.  No signs of infection.  No skin changes or dimpling are  noted.      Medical decision-making and discussion:  1. Nipple fissure  Advised patient to continue using lanolin suggested breast soothie pads.  Reassured patient symptoms does not present as he is.  Suggested a nipple shield and to pump for a few days to see if symptoms improve.  Suggested for patient to follow-up with lactation clinic for further evaluation.    Follow-up for worsening symptoms,lack of expected recovery, or should new symptoms or problems " arise.    2. Laryngitis  • Twice daily use of nasal saline rinse or Neti-Pot encouraged. Can use over the counter Flonase.  • Use over-the-counter Tylenol as needed.  • Suggested Chloraseptic spray and throat lozenges.  Also suggested gargling with warm salt water.  • Voice rest.  • Drink plenty of fluids to keep yourself hydrated. Warm fluids like tea and hot soup are better.  • Increase humidity in the house with a humidifier.    • Follow-up for worsening symptoms, difficulty breathing, lack of expected recovery, or should new symptoms or problems arise.    Please note that this dictation was created using voice recognition software. I have made every reasonable attempt to correct obvious errors, but I expect that there are errors of grammar and possibly content that I did not discover before finalizing the note.      Return if symptoms worsen or fail to improve.

## 2019-08-09 ENCOUNTER — OFFICE VISIT (OUTPATIENT)
Dept: MEDICAL GROUP | Facility: PHYSICIAN GROUP | Age: 29
End: 2019-08-09
Payer: COMMERCIAL

## 2019-08-09 VITALS
HEIGHT: 62 IN | SYSTOLIC BLOOD PRESSURE: 100 MMHG | HEART RATE: 98 BPM | RESPIRATION RATE: 14 BRPM | BODY MASS INDEX: 25.4 KG/M2 | TEMPERATURE: 98.4 F | WEIGHT: 138 LBS | DIASTOLIC BLOOD PRESSURE: 68 MMHG | OXYGEN SATURATION: 99 %

## 2019-08-09 DIAGNOSIS — F90.0 ATTENTION DEFICIT HYPERACTIVITY DISORDER (ADHD), PREDOMINANTLY INATTENTIVE TYPE: ICD-10-CM

## 2019-08-09 DIAGNOSIS — G43.009 MIGRAINE WITHOUT AURA AND WITHOUT STATUS MIGRAINOSUS, NOT INTRACTABLE: ICD-10-CM

## 2019-08-09 PROCEDURE — 99214 OFFICE O/P EST MOD 30 MIN: CPT | Performed by: FAMILY MEDICINE

## 2019-08-09 RX ORDER — DEXTROAMPHETAMINE SACCHARATE, AMPHETAMINE ASPARTATE MONOHYDRATE, DEXTROAMPHETAMINE SULFATE AND AMPHETAMINE SULFATE 3.75; 3.75; 3.75; 3.75 MG/1; MG/1; MG/1; MG/1
15 CAPSULE, EXTENDED RELEASE ORAL EVERY MORNING
Qty: 30 CAP | Refills: 0 | Status: SHIPPED | OUTPATIENT
Start: 2019-08-09 | End: 2019-09-08

## 2019-08-09 RX ORDER — COPPER 313.4 MG/1
INTRAUTERINE DEVICE INTRAUTERINE
COMMUNITY
End: 2019-11-06

## 2019-08-09 RX ORDER — DEXTROAMPHETAMINE SACCHARATE, AMPHETAMINE ASPARTATE MONOHYDRATE, DEXTROAMPHETAMINE SULFATE AND AMPHETAMINE SULFATE 3.75; 3.75; 3.75; 3.75 MG/1; MG/1; MG/1; MG/1
15 CAPSULE, EXTENDED RELEASE ORAL EVERY MORNING
Qty: 30 CAP | Refills: 0 | Status: SHIPPED | OUTPATIENT
Start: 2019-10-10 | End: 2019-11-06

## 2019-08-09 RX ORDER — DEXTROAMPHETAMINE SACCHARATE, AMPHETAMINE ASPARTATE MONOHYDRATE, DEXTROAMPHETAMINE SULFATE AND AMPHETAMINE SULFATE 3.75; 3.75; 3.75; 3.75 MG/1; MG/1; MG/1; MG/1
15 CAPSULE, EXTENDED RELEASE ORAL EVERY MORNING
Qty: 30 CAP | Refills: 0 | Status: SHIPPED | OUTPATIENT
Start: 2019-09-09 | End: 2019-10-09

## 2019-08-09 NOTE — PROGRESS NOTES
Subjective:   Sakshi Gibbs is a 29 y.o. female here today in regards to being placed back on Adderral.     Attention deficit hyperactivity disorder (ADHD), predominantly inattentive type   Chronic in nature. Patient has a long-standing history of ADHD from when she was  a teenage. She has previously been seen and evaluated by psychiatry. I have previously prescribed her Adderrall with good results. She has not been taking it due to her pregnancy and breast-feeding, but she would like to discuss restarting it since she has given birth and stopped feeding. 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 Denies any chest pain or palpitations. She has previously signed a controlled substance agreement and informed consent. Of note, patient has been placed back on a copper IUD for birth control. She is doing well, and she has been adjusting to her cycles.    Is the pain medication improving the patient’s symptoms: Never prescribed  Any adverse effects: Never prescribed    Alcohol or illicit drug use:   She  reports that she drinks alcohol.  She  reports that she does not use drugs.     History of controlled substance used in a way other than prescribed? No  Any early refills of a controlled substance: No  History of lost or stolen controlled substance prescription: No  Any aberrant behavior or intoxication while on a controlled substance: No  Has the patient self-modified their dose or frequency of the medication :No  Compliant with treatment recommendations and plan: Yes  Any major health change to the patient: No  Concerns for misuse, abuse or addiction: No    /NarxCheck report reviewed: Yes  History of abnormal drug screening: No    Migraine without aura and without status migrainosus, not intractable  Chronic in nature. She notes these have improved and been less frequent. Since having her baby, she has stopped taking her propanolol which we had her on prophylactically.     Current  "medicines (including changes today)  Current Outpatient Medications   Medication Sig Dispense Refill   • amphetamine-dextroamphetamine (ADDERALL XR) 15 MG XR capsule Take 1 Cap by mouth every morning for 30 days. 30 Cap 0   • [START ON 9/9/2019] amphetamine-dextroamphetamine (ADDERALL XR) 15 MG XR capsule Take 1 Cap by mouth every morning for 30 days. 30 Cap 0   • [START ON 10/10/2019] amphetamine-dextroamphetamine (ADDERALL XR) 15 MG XR capsule Take 1 Cap by mouth every morning for 30 days. 30 Cap 0   • PARAGARD INTRAUTERINE COPPER IUD by Intrauterine route.     • valACYclovir (VALTREX) 500 MG Tab Take 1 Tab by mouth 2 times a day. 180 Tab 3   • acetaminophen (TYLENOL) 325 MG Tab Take 1-2 Tabs by mouth every four hours as needed for Mild Pain or Moderate Pain. 30 Tab 0   • docusate sodium 100 MG Cap Take 100 mg by mouth 2 times a day as needed for Constipation. 60 Cap    • ibuprofen (MOTRIN) 600 MG Tab Take 1 Tab by mouth every 6 hours as needed for Mild Pain or Moderate Pain. 20 Tab 1   • Prenatal MV-Min-Fe Fum-FA-DHA (PRENATAL 1 PO) Take  by mouth.       No current facility-administered medications for this visit.      She  has a past medical history of ADHD and Migraine.    ROS   No chest pain, no shortness of breath, no abdominal pain.     Objective:     Physical Exam:  /68   Pulse 98   Temp 36.9 °C (98.4 °F)   Resp 14   Ht 1.575 m (5' 2\")   Wt 62.6 kg (138 lb)   SpO2 99%  Body mass index is 25.24 kg/m².   Constitutional: Alert, no distress.  Skin: Warm, dry, good turgor, no rashes in visible areas.  Eye: Equal, round and reactive, conjunctiva clear, lids normal.  ENMT: Lips without lesions, good dentition, oropharynx clear.  Neck: Trachea midline, no masses, no thyromegaly.   Respiratory: Unlabored respiratory effort, lungs clear to auscultation, no wheezes, no rhonchi.  Cardiovascular: Normal S1, S2, no murmur, no edema.  Psych: Alert and oriented x3, normal affect and mood.    Assessment and " Plan:     1. Attention deficit hyperactivity disorder (ADHD), predominantly inattentive type  She is no longer breastfeeding. I will be restarting the patient on Adderrall XR 15 mg for maintenance of this chronic issue. She does not have any concerning cardiac history or symptoms, and this medication has worked well for her in the past. I have provided her with 3 separate prescriptions to take to her pharmacy who will distribute them to her monthly.  reviewed and appropriate. No concerns of misuse. We will follow-up with the patient in 3 months for a re-evaluation of this issue.   - amphetamine-dextroamphetamine (ADDERALL XR) 15 MG XR capsule; Take 1 Cap by mouth every morning for 30 days.  Dispense: 30 Cap; Refill: 0  - amphetamine-dextroamphetamine (ADDERALL XR) 15 MG XR capsule; Take 1 Cap by mouth every morning for 30 days.  Dispense: 30 Cap; Refill: 0  - amphetamine-dextroamphetamine (ADDERALL XR) 15 MG XR capsule; Take 1 Cap by mouth every morning for 30 days.  Dispense: 30 Cap; Refill: 0    2. Migraine without aura and without status migrainosus, not intractable  Chronic and improved. Migraines have become less frequent, so the patient has stopped taking her propanolol for this issue. She will try to monitor this through her own efforts.     Followup: Return in about 3 months (around 11/9/2019) for f/u ADHD, short.          Bipin KNIGHT (Scribe), am scribing for, and in the presence of, Laquita Mars MD    Electronically signed by: Bipin Haskins (Carrie), 8/9/2019    Laquita KNIGHT MD personally performed the services described in this documentation, as scribed by Bipin Haskins in my presence, and it is both accurate and complete.

## 2019-08-14 ENCOUNTER — TELEPHONE (OUTPATIENT)
Dept: MEDICAL GROUP | Facility: PHYSICIAN GROUP | Age: 29
End: 2019-08-14

## 2019-08-14 NOTE — TELEPHONE ENCOUNTER
DOCUMENTATION OF PAR STATUS:    1. Name of Medication & Dose:  Dextroamp- Amphet 15 mg cap    2. Name of Prescription Coverage Company & phone #: [employer] [phone]    3. Date Prior Auth Submitted: 8/14/19    4. What information was given to obtain insurance decision? ICD 10 code    5. Prior Auth Status? Pending    6. Patient Notified: no

## 2019-11-06 ENCOUNTER — OFFICE VISIT (OUTPATIENT)
Dept: MEDICAL GROUP | Facility: PHYSICIAN GROUP | Age: 29
End: 2019-11-06
Payer: COMMERCIAL

## 2019-11-06 VITALS
BODY MASS INDEX: 23.19 KG/M2 | TEMPERATURE: 98.6 F | HEIGHT: 62 IN | HEART RATE: 91 BPM | DIASTOLIC BLOOD PRESSURE: 60 MMHG | OXYGEN SATURATION: 98 % | WEIGHT: 126 LBS | RESPIRATION RATE: 16 BRPM | SYSTOLIC BLOOD PRESSURE: 98 MMHG

## 2019-11-06 DIAGNOSIS — Z97.5 BREAKTHROUGH BLEEDING ASSOCIATED WITH INTRAUTERINE DEVICE (IUD): ICD-10-CM

## 2019-11-06 DIAGNOSIS — F90.0 ATTENTION DEFICIT HYPERACTIVITY DISORDER (ADHD), PREDOMINANTLY INATTENTIVE TYPE: ICD-10-CM

## 2019-11-06 DIAGNOSIS — Z23 NEED FOR VACCINATION: ICD-10-CM

## 2019-11-06 DIAGNOSIS — B00.1 RECURRENT COLD SORES: ICD-10-CM

## 2019-11-06 DIAGNOSIS — N92.1 BREAKTHROUGH BLEEDING ASSOCIATED WITH INTRAUTERINE DEVICE (IUD): ICD-10-CM

## 2019-11-06 PROCEDURE — 90686 IIV4 VACC NO PRSV 0.5 ML IM: CPT | Performed by: FAMILY MEDICINE

## 2019-11-06 PROCEDURE — 90471 IMMUNIZATION ADMIN: CPT | Performed by: FAMILY MEDICINE

## 2019-11-06 PROCEDURE — 99214 OFFICE O/P EST MOD 30 MIN: CPT | Mod: 25 | Performed by: FAMILY MEDICINE

## 2019-11-06 RX ORDER — DEXTROAMPHETAMINE SACCHARATE, AMPHETAMINE ASPARTATE MONOHYDRATE, DEXTROAMPHETAMINE SULFATE AND AMPHETAMINE SULFATE 3.75; 3.75; 3.75; 3.75 MG/1; MG/1; MG/1; MG/1
15 CAPSULE, EXTENDED RELEASE ORAL EVERY MORNING
Qty: 30 CAP | Refills: 0 | Status: CANCELLED | OUTPATIENT
Start: 2019-11-08 | End: 2019-12-08

## 2019-11-06 RX ORDER — DEXTROAMPHETAMINE SACCHARATE, AMPHETAMINE ASPARTATE MONOHYDRATE, DEXTROAMPHETAMINE SULFATE AND AMPHETAMINE SULFATE 3.75; 3.75; 3.75; 3.75 MG/1; MG/1; MG/1; MG/1
15 CAPSULE, EXTENDED RELEASE ORAL EVERY MORNING
Qty: 30 CAP | Refills: 0 | Status: CANCELLED | OUTPATIENT
Start: 2020-01-09 | End: 2020-02-08

## 2019-11-06 RX ORDER — DEXTROAMPHETAMINE SACCHARATE, AMPHETAMINE ASPARTATE MONOHYDRATE, DEXTROAMPHETAMINE SULFATE AND AMPHETAMINE SULFATE 5; 5; 5; 5 MG/1; MG/1; MG/1; MG/1
20 CAPSULE, EXTENDED RELEASE ORAL EVERY MORNING
Qty: 30 CAP | Refills: 0 | Status: SHIPPED | OUTPATIENT
Start: 2019-11-06 | End: 2019-12-06

## 2019-11-06 RX ORDER — DEXTROAMPHETAMINE SACCHARATE, AMPHETAMINE ASPARTATE MONOHYDRATE, DEXTROAMPHETAMINE SULFATE AND AMPHETAMINE SULFATE 5; 5; 5; 5 MG/1; MG/1; MG/1; MG/1
20 CAPSULE, EXTENDED RELEASE ORAL EVERY MORNING
Qty: 30 CAP | Refills: 0 | Status: SHIPPED | OUTPATIENT
Start: 2020-01-07 | End: 2020-02-03 | Stop reason: SDUPTHER

## 2019-11-06 RX ORDER — DEXTROAMPHETAMINE SACCHARATE, AMPHETAMINE ASPARTATE MONOHYDRATE, DEXTROAMPHETAMINE SULFATE AND AMPHETAMINE SULFATE 5; 5; 5; 5 MG/1; MG/1; MG/1; MG/1
20 CAPSULE, EXTENDED RELEASE ORAL EVERY MORNING
Qty: 30 CAP | Refills: 0 | Status: SHIPPED | OUTPATIENT
Start: 2019-12-07 | End: 2020-01-06

## 2019-11-06 RX ORDER — DEXTROAMPHETAMINE SACCHARATE, AMPHETAMINE ASPARTATE MONOHYDRATE, DEXTROAMPHETAMINE SULFATE AND AMPHETAMINE SULFATE 3.75; 3.75; 3.75; 3.75 MG/1; MG/1; MG/1; MG/1
15 CAPSULE, EXTENDED RELEASE ORAL EVERY MORNING
Qty: 30 CAP | Refills: 0 | Status: CANCELLED | OUTPATIENT
Start: 2019-12-09 | End: 2020-01-08

## 2019-11-06 NOTE — PROGRESS NOTES
Subjective:   Sakshi Gibbs is a 29 y.o. female here today for follow-up ADHD, questions about IUD and cold sores. She is accompanied by her son.    Attention deficit hyperactivity disorder (ADHD), predominantly inattentive type  Chronic, but with breakthrough symptoms. Patient takes Adderall XR 15 mg once a day for control of her ADHD. She reports good control of her symptoms, but is feeling that the dose may not be high enough. She has not noticed much difference during the work week when she takes it. She does mention that she and her  are going through some marital issues and he is seeing a therapist. She has also taken on an additional class at work and is feeling stressed. Denies headaches, heart palpitations, weight loss or difficulty sleeping. Controlled substance treatment agreement is due today.    Breakthrough bleeding associated with intrauterine device (IUD)  Patient had a copper IUD until a few months ago when it expelled. She recently had a Mirena IUD placed. Since she has the new IUD placed she has had breakthrough bleeding and longer periods. She has also had acne outbreaks. It has been less than two months with the new IUD. Denies pain or vaginal discharge.    Recurrent cold sores  Worsening. She is having more frequent outbreaks. Currently taking Valtrex 500mg as needed, but lately has been taking it almost every day. She will increase this to 2g twice a day when she does have an outbreak. She worries about spreading it to her son.    Current medicines (including changes today)  Current Outpatient Medications   Medication Sig Dispense Refill   • amphetamine-dextroamphetamine (ADDERALL XR) 20 MG per XR capsule Take 1 Cap by mouth every morning for 30 days. 30 Cap 0   • [START ON 12/7/2019] amphetamine-dextroamphetamine (ADDERALL XR) 20 MG per XR capsule Take 1 Cap by mouth every morning for 30 days. 30 Cap 0   • [START ON 1/7/2020] amphetamine-dextroamphetamine (ADDERALL XR) 20 MG  "per XR capsule Take 1 Cap by mouth every morning for 30 days. 30 Cap 0   • levonorgestrel (MIRENA) 20 MCG/24HR IUD 1 Each by Intrauterine route Once.     • acetaminophen (TYLENOL) 325 MG Tab Take 1-2 Tabs by mouth every four hours as needed for Mild Pain or Moderate Pain. 30 Tab 0   • docusate sodium 100 MG Cap Take 100 mg by mouth 2 times a day as needed for Constipation. 60 Cap    • ibuprofen (MOTRIN) 600 MG Tab Take 1 Tab by mouth every 6 hours as needed for Mild Pain or Moderate Pain. 20 Tab 1   • valACYclovir (VALTREX) 500 MG Tab Take 1 Tab by mouth 2 times a day. 180 Tab 3   • Prenatal MV-Min-Fe Fum-FA-DHA (PRENATAL 1 PO) Take  by mouth.       No current facility-administered medications for this visit.      She  has a past medical history of ADHD and Migraine.    ROS  No chest pain, no shortness of breath, no abdominal pain.     Objective:     Physical Exam:  BP (!) 98/60   Pulse 91   Temp 37 °C (98.6 °F)   Resp 16   Ht 1.575 m (5' 2\")   Wt 57.2 kg (126 lb)   SpO2 98%  Body mass index is 23.05 kg/m².   Constitutional: Alert, no distress, well-groomed.  Skin: Warm, dry, good turgor, no rashes or cold sores in visible areas.   Eye: Equal, round and reactive, conjunctiva clear, lids normal.  ENMT: Lips without lesions, good dentition, moist mucous membranes.  Neck: Trachea midline, no masses, no thyromegaly.  Respiratory: Unlabored respiratory effort, no cough.  MSK: Normal gait, moves all extremities.  Neuro: Grossly non-focal. No cranial nerve deficit. Strength and sensation intact.   Psych: Alert and oriented x3, normal affect and mood.    Assessment and Plan:     1. Attention deficit hyperactivity disorder (ADHD), predominantly inattentive type  Chronic, but not well controlled. Trial of increased Adderall XR 20mg daily for the next few months. Advised her to watch for side effects such as headache, abdominal pain, or increased heart rate. If she experiences any of these side effects she should contact " me and be seen for reevaluation.  reviewed and appropriate. New controlled substance treatment agreement signed today. Patient is aware she may be requested to provide a urine drug screen.  - Controlled Substance Treatment Agreement  - amphetamine-dextroamphetamine (ADDERALL XR) 20 MG per XR capsule; Take 1 Cap by mouth every morning for 30 days.  Dispense: 30 Cap; Refill: 0  - amphetamine-dextroamphetamine (ADDERALL XR) 20 MG per XR capsule; Take 1 Cap by mouth every morning for 30 days.  Dispense: 30 Cap; Refill: 0  - amphetamine-dextroamphetamine (ADDERALL XR) 20 MG per XR capsule; Take 1 Cap by mouth every morning for 30 days.  Dispense: 30 Cap; Refill: 0    2. Breakthrough bleeding associated with intrauterine device (IUD)  This is a new issue. Provided reassurance. Discussed use of NSAIDs if needed. If her symptoms do not resolve in the next 3-6 months she should talk with her gynecologist.    3. Recurrent cold sores  Worsening. Instructed her to switch to daily administration for now. Will reassess in 1-2 months. Increased frequency likely due to increased stressors.    4. Need for vaccination  Influenza vaccine given today without adverse effects.   - Influenza Vaccine Quad Injection (PF)    Followup: Return in about 3 months (around 2/6/2020) for f/u ADHD, short.          Cindi KNIGHT (Scribe), am scribing for, and in the presence of, Laquita Mars MD    Electronically signed by: Cindi Christensen (Libbyibe), 11/6/2019    Laquita KNIGHT MD personally performed the services described in this documentation, as scribed by Cindi Christensen in my presence, and it is both accurate and complete.

## 2020-02-03 ENCOUNTER — OFFICE VISIT (OUTPATIENT)
Dept: MEDICAL GROUP | Facility: PHYSICIAN GROUP | Age: 30
End: 2020-02-03
Payer: COMMERCIAL

## 2020-02-03 VITALS
SYSTOLIC BLOOD PRESSURE: 104 MMHG | HEART RATE: 73 BPM | DIASTOLIC BLOOD PRESSURE: 70 MMHG | WEIGHT: 125.2 LBS | RESPIRATION RATE: 16 BRPM | HEIGHT: 62 IN | TEMPERATURE: 97.8 F | BODY MASS INDEX: 23.04 KG/M2 | OXYGEN SATURATION: 98 %

## 2020-02-03 DIAGNOSIS — G43.009 MIGRAINE WITHOUT AURA AND WITHOUT STATUS MIGRAINOSUS, NOT INTRACTABLE: ICD-10-CM

## 2020-02-03 DIAGNOSIS — B00.1 RECURRENT COLD SORES: ICD-10-CM

## 2020-02-03 DIAGNOSIS — F90.0 ATTENTION DEFICIT HYPERACTIVITY DISORDER (ADHD), PREDOMINANTLY INATTENTIVE TYPE: ICD-10-CM

## 2020-02-03 PROCEDURE — 99214 OFFICE O/P EST MOD 30 MIN: CPT | Performed by: FAMILY MEDICINE

## 2020-02-03 RX ORDER — COPPER 313.4 MG/1
INTRAUTERINE DEVICE INTRAUTERINE
COMMUNITY
End: 2020-08-14

## 2020-02-03 RX ORDER — PROPRANOLOL HYDROCHLORIDE 80 MG/1
80 TABLET ORAL 3 TIMES DAILY
COMMUNITY
End: 2020-05-28 | Stop reason: SDUPTHER

## 2020-02-03 RX ORDER — DEXTROAMPHETAMINE SACCHARATE, AMPHETAMINE ASPARTATE MONOHYDRATE, DEXTROAMPHETAMINE SULFATE AND AMPHETAMINE SULFATE 5; 5; 5; 5 MG/1; MG/1; MG/1; MG/1
20 CAPSULE, EXTENDED RELEASE ORAL EVERY MORNING
Qty: 30 CAP | Refills: 0 | Status: SHIPPED | OUTPATIENT
Start: 2020-04-09 | End: 2020-05-06 | Stop reason: SDUPTHER

## 2020-02-03 RX ORDER — DEXTROAMPHETAMINE SACCHARATE, AMPHETAMINE ASPARTATE MONOHYDRATE, DEXTROAMPHETAMINE SULFATE AND AMPHETAMINE SULFATE 5; 5; 5; 5 MG/1; MG/1; MG/1; MG/1
20 CAPSULE, EXTENDED RELEASE ORAL EVERY MORNING
Qty: 30 CAP | Refills: 0 | Status: SHIPPED | OUTPATIENT
Start: 2020-03-09 | End: 2020-04-08

## 2020-02-03 RX ORDER — VALACYCLOVIR HYDROCHLORIDE 500 MG/1
500 TABLET, FILM COATED ORAL 2 TIMES DAILY
Qty: 180 TAB | Refills: 3 | Status: SHIPPED | OUTPATIENT
Start: 2020-02-03 | End: 2021-03-01

## 2020-02-03 RX ORDER — DEXTROAMPHETAMINE SACCHARATE, AMPHETAMINE ASPARTATE MONOHYDRATE, DEXTROAMPHETAMINE SULFATE AND AMPHETAMINE SULFATE 5; 5; 5; 5 MG/1; MG/1; MG/1; MG/1
20 CAPSULE, EXTENDED RELEASE ORAL EVERY MORNING
Qty: 30 CAP | Refills: 0 | Status: SHIPPED | OUTPATIENT
Start: 2020-02-07 | End: 2020-03-08

## 2020-02-03 RX ORDER — RIZATRIPTAN BENZOATE 10 MG/1
10 TABLET, ORALLY DISINTEGRATING ORAL
Qty: 27 TAB | Refills: 3 | Status: SHIPPED | OUTPATIENT
Start: 2020-02-03 | End: 2021-02-19

## 2020-02-03 ASSESSMENT — PATIENT HEALTH QUESTIONNAIRE - PHQ9: CLINICAL INTERPRETATION OF PHQ2 SCORE: 0

## 2020-02-04 NOTE — PROGRESS NOTES
Subjective:   Sakshi Gibbs is a 29 y.o. female here today for an ADHD follow up.     Recurrent cold sores  Patient has a history of recurrent cold sores and has frequent outbreaks. She was previously controlled on Valtrex 500 mg PRN for this with good results. She has recently changed insurances and is requesting an additional prescription today.    Migraine without aura and without status migrainosus, not intractable  She reports recurrent migraines and has noticed that her migraines are exacerbated with added hormones, her menstrual cycle, and lack of sleep. Her migraines will occur approximately once per week. She recently had her IUD changed from the mirena to the paraguard and is hoping her migraines will start to taper off the longer she is on this new IUD. She continues to take propranolol 80 mg once per day. Patient has previously taken Maxalt with good results. She has also taken Imitrex in the past with undesirable side effects.     Patient is not on her mirena anymore and had it replaced with the paraguard. Patient notes improved mood changes with the paraguard.      Attention deficit hyperactivity disorder (ADHD), predominantly inattentive type  Patient's dose was recently increased to 20 mg daily. She reports good results with this increased dose and would like to continue. Denies headaches, difficulty sleeping, chest pain or poor appetite.    Current medicines (including changes today)  Current Outpatient Medications   Medication Sig Dispense Refill   • propranolol (INDERAL) 80 MG Tab Take 80 mg by mouth 3 times a day.     • Paragard Intrauterine Copper IUD by Intrauterine route.     • [START ON 2/7/2020] amphetamine-dextroamphetamine (ADDERALL XR) 20 MG per XR capsule Take 1 Cap by mouth every morning for 30 days. 30 Cap 0   • [START ON 3/9/2020] amphetamine-dextroamphetamine (ADDERALL XR) 20 MG per XR capsule Take 1 Cap by mouth every morning for 30 days. 30 Cap 0   • [START ON 4/9/2020]  "amphetamine-dextroamphetamine (ADDERALL XR) 20 MG per XR capsule Take 1 Cap by mouth every morning for 30 days. 30 Cap 0   • valACYclovir (VALTREX) 500 MG Tab Take 1 Tab by mouth 2 times a day. 180 Tab 3   • rizatriptan (MAXALT-MLT) 10 MG disintegrating tablet Take 1 Tab by mouth Once PRN for Migraine for up to 1 dose. 27 Tab 3   • ibuprofen (MOTRIN) 600 MG Tab Take 1 Tab by mouth every 6 hours as needed for Mild Pain or Moderate Pain. 20 Tab 1   • Prenatal MV-Min-Fe Fum-FA-DHA (PRENATAL 1 PO) Take  by mouth.       No current facility-administered medications for this visit.      She  has a past medical history of ADHD and Migraine.    ROS  No chest pain, no shortness of breath, no abdominal pain.     Objective:     Physical Exam:  /70 (BP Location: Left arm, Patient Position: Sitting, BP Cuff Size: Adult)   Pulse 73   Temp 36.6 °C (97.8 °F) (Temporal)   Resp 16   Ht 1.575 m (5' 2\")   Wt 56.8 kg (125 lb 3.2 oz)   SpO2 98%  Body mass index is 22.9 kg/m².   Constitutional: Alert, no distress, well-groomed.  Skin: Warm, dry, good turgor, no rashes in visible areas.  Eye: Equal, round and reactive, conjunctiva clear, lids normal.  ENMT: Lips without lesions, good dentition, moist mucous membranes.  Neck: Trachea midline, no masses, no thyromegaly.  Respiratory: Unlabored respiratory effort, no cough.  Abdomen: Soft, no gross masses.  MSK: Normal gait, moves all extremities.  Neuro: Grossly non-focal. No cranial nerve deficit. Strength and sensation intact.   Psych: Alert and oriented x3, normal affect and mood.     Assessment and Plan:     1. Recurrent cold sores  Known history. Patient has done well in the past on Valtrex with no reported sided effects. Prescription will be refilled.  - valACYclovir (VALTREX) 500 MG Tab; Take 1 Tab by mouth 2 times a day.  Dispense: 180 Tab; Refill: 3    2. Migraine without aura and without status migrainosus, not intractable  Patient has previously done well on Maxalt to " control her migraines without side effects Prescription will be refilled.  - rizatriptan (MAXALT-MLT) 10 MG disintegrating tablet; Take 1 Tab by mouth Once PRN for Migraine for up to 1 dose.  Dispense: 27 Tab; Refill: 3    3. Attention deficit hyperactivity disorder (ADHD), predominantly inattentive type  Patient will continue on current dose of 20 mg daily. She reports no side effects with this medication. She will follow up in 3 months. Advised her to watch for side effects such as headache, abdominal pain, or increased heart rate. If she experiences any of these side effects she should contact me and be seen for reevaluation.  reviewed and appropriate. New controlled substance treatment agreement signed today. Patient is aware she may be requested to provide a urine drug screen.  - amphetamine-dextroamphetamine (ADDERALL XR) 20 MG per XR capsule; Take 1 Cap by mouth every morning for 30 days.  Dispense: 30 Cap; Refill: 0  - amphetamine-dextroamphetamine (ADDERALL XR) 20 MG per XR capsule; Take 1 Cap by mouth every morning for 30 days.  Dispense: 30 Cap; Refill: 0  - amphetamine-dextroamphetamine (ADDERALL XR) 20 MG per XR capsule; Take 1 Cap by mouth every morning for 30 days.  Dispense: 30 Cap; Refill: 0     Followup: Return in about 3 months (around 5/3/2020) for f/u ADHD, short.          Theodore KNIGHT (Scribe), am scribing for, and in the presence of, Laquita Mars MD    Electronically signed by: Theodore Mathew (Koko), 2/3/2020    Laquita KNIGHT MD personally performed the services described in this documentation, as scribed by Theodore Mathew in my presence, and it is both accurate and complete. es

## 2020-02-05 ENCOUNTER — TELEPHONE (OUTPATIENT)
Dept: MEDICAL GROUP | Facility: PHYSICIAN GROUP | Age: 30
End: 2020-02-05

## 2020-02-05 NOTE — TELEPHONE ENCOUNTER
Phone Number Called: [phone] (home)       Call outcome: Did not leave a detailed message. Requested patient to call back.    Message: Clarification on Medication Allergy.  Received Fax from [facility] stating Pt may be allergic to Rizatriptan.

## 2020-02-07 NOTE — TELEPHONE ENCOUNTER
Phone Number Called: [phone] (home)       Call outcome: Did not leave a detailed message. Requested patient to call back.    Message: 2nd message, need medication clarification.

## 2020-02-10 NOTE — TELEPHONE ENCOUNTER
Phone Number Called: [phone] (home)       Call outcome: Spoke to patient regarding message below.    Message: Pt stated she has been taking this medication for years, and no she is not allergic.  Can we please send in.

## 2020-02-26 ENCOUNTER — HOSPITAL ENCOUNTER (OUTPATIENT)
Facility: MEDICAL CENTER | Age: 30
End: 2020-02-26
Attending: PHYSICIAN ASSISTANT
Payer: COMMERCIAL

## 2020-02-26 ENCOUNTER — OFFICE VISIT (OUTPATIENT)
Dept: MEDICAL GROUP | Facility: PHYSICIAN GROUP | Age: 30
End: 2020-02-26
Payer: COMMERCIAL

## 2020-02-26 VITALS
SYSTOLIC BLOOD PRESSURE: 110 MMHG | OXYGEN SATURATION: 97 % | RESPIRATION RATE: 16 BRPM | DIASTOLIC BLOOD PRESSURE: 80 MMHG | WEIGHT: 121.4 LBS | TEMPERATURE: 98.9 F | HEIGHT: 62 IN | HEART RATE: 72 BPM | BODY MASS INDEX: 22.34 KG/M2

## 2020-02-26 DIAGNOSIS — R30.0 DYSURIA: ICD-10-CM

## 2020-02-26 LAB
APPEARANCE UR: NORMAL
BILIRUB UR STRIP-MCNC: NORMAL MG/DL
COLOR UR AUTO: NORMAL
GLUCOSE UR STRIP.AUTO-MCNC: NORMAL MG/DL
KETONES UR STRIP.AUTO-MCNC: NORMAL MG/DL
LEUKOCYTE ESTERASE UR QL STRIP.AUTO: NORMAL
NITRITE UR QL STRIP.AUTO: NORMAL
PH UR STRIP.AUTO: 5.5 [PH] (ref 5–8)
PROT UR QL STRIP: NORMAL MG/DL
RBC UR QL AUTO: NORMAL
SP GR UR STRIP.AUTO: 1.02
UROBILINOGEN UR STRIP-MCNC: 0.2 MG/DL

## 2020-02-26 PROCEDURE — 99000 SPECIMEN HANDLING OFFICE-LAB: CPT | Performed by: PHYSICIAN ASSISTANT

## 2020-02-26 PROCEDURE — 87086 URINE CULTURE/COLONY COUNT: CPT

## 2020-02-26 PROCEDURE — 81002 URINALYSIS NONAUTO W/O SCOPE: CPT | Performed by: PHYSICIAN ASSISTANT

## 2020-02-26 PROCEDURE — 99214 OFFICE O/P EST MOD 30 MIN: CPT | Performed by: PHYSICIAN ASSISTANT

## 2020-02-26 RX ORDER — NITROFURANTOIN 25; 75 MG/1; MG/1
100 CAPSULE ORAL EVERY 12 HOURS
Qty: 10 CAP | Refills: 0 | Status: SHIPPED | OUTPATIENT
Start: 2020-02-26 | End: 2020-03-02

## 2020-02-26 RX ORDER — PHENAZOPYRIDINE HYDROCHLORIDE 200 MG/1
200 TABLET, FILM COATED ORAL 3 TIMES DAILY
Qty: 6 TAB | Refills: 0 | Status: SHIPPED | OUTPATIENT
Start: 2020-02-26 | End: 2020-02-28

## 2020-02-26 NOTE — PROGRESS NOTES
"  Chief Complaint   Patient presents with   • Bladder Problem     \"bladder pain\" x 3-4 weeks        HPI:  Symptom onset: 3 weeks ago.  Current symptoms:  Patient states for the past 3 weeks she is been experiencing an intermittent low-grade aching pain of bladder region.  States she is experiencing intermittent episodes of bladder pressure as well as dysuria.  States on one occasion she experienced a sharp pain that felt similar to past urinary tract infections.  States she is experiencing pain with intercourse.  Denies urgency or frequency.  Negative for blood in urine.  Patient states for several years she is experienced intermittent episodes of bladder irritation.  States bladder is irritated by coffee, alcohol, and dehydration.  Since onset symptoms are: Unchanged  Treatments tried: OTC antispasm med.  Associated symptoms: Negative for fever, flank pain, nausea and vomiting, vaginal discharge.  History is negative for frequent UTI.     ROS:  Denies fever, chills, vomiting or abdominal pain.     OBJECTIVE:  /80 (BP Location: Left arm, Patient Position: Sitting)   Pulse 72   Temp 37.2 °C (98.9 °F) (Temporal)   Resp 16   Ht 1.575 m (5' 2\")   Wt 55.1 kg (121 lb 6.4 oz)   SpO2 97%   Gen: Alert, NAD.  Chest: Lungs clear to auscultation, CV RRR.  Abdomen: Soft, tender in suprapubic region. No CVAT. Normal bowel sounds.     Lab Results   Component Value Date    POCCOLOR michael 02/26/2020    POCAPPEAR slight cldy 02/26/2020    POCLEUKEST neg 02/26/2020    POCNITRITE neg 02/26/2020    POCUROBILIGE 0.2 02/26/2020    POCPROTEIN neg 02/26/2020    POCURPH 5.5 02/26/2020    POCBLOOD sm 02/26/2020    POCSPGRV 1.025 02/26/2020    POCKETONES neg 02/26/2020    POCBILIRUBIN neg 02/26/2020    POCGLUCUA neg 02/26/2020          ASSESSMENT/PLAN:     1. Dysuria      Patient is symptomatic.  Urine culture has been obtained.  Patient be contacted with results.     1. Abnormal urine dipstick in office. Urine sent for culture. " Start antibiotics.  2. Provided education to drink plenty of fluids, wipe front to back every void and bowel movement.   3. Return to clinic if symptoms not improving within 3-4 days or in case of vomiting, fever, increasing pain.    Advised patient if urine culture is negative and she is still experiencing bladder pressure a referral to urology was made at that time.  Discussed interstitial cystitis with patient.  Patient agreed to plan.

## 2020-02-27 LAB
AMBIGUOUS DTTM AMBI4: NORMAL
SIGNIFICANT IND 70042: NORMAL
SITE SITE: NORMAL
SOURCE SOURCE: NORMAL

## 2020-02-29 LAB
BACTERIA UR CULT: NORMAL
SIGNIFICANT IND 70042: NORMAL
SITE SITE: NORMAL
SOURCE SOURCE: NORMAL

## 2020-03-20 DIAGNOSIS — R30.0 DYSURIA: ICD-10-CM

## 2020-04-08 ENCOUNTER — PATIENT MESSAGE (OUTPATIENT)
Dept: MEDICAL GROUP | Facility: PHYSICIAN GROUP | Age: 30
End: 2020-04-08

## 2020-04-09 NOTE — TELEPHONE ENCOUNTER
Phone Number Called: [phone] (home)     Call outcome: Left detailed message for patient. Informed to call back with any additional questions.    Message: LVM to schedule a virtual appt.

## 2020-04-10 ENCOUNTER — TELEMEDICINE (OUTPATIENT)
Dept: MEDICAL GROUP | Facility: PHYSICIAN GROUP | Age: 30
End: 2020-04-10
Payer: COMMERCIAL

## 2020-04-10 DIAGNOSIS — R30.0 DYSURIA: ICD-10-CM

## 2020-04-10 DIAGNOSIS — R39.82 CHRONIC BLADDER PAIN: ICD-10-CM

## 2020-04-10 PROCEDURE — 99214 OFFICE O/P EST MOD 30 MIN: CPT | Mod: 95,CR | Performed by: PHYSICIAN ASSISTANT

## 2020-04-10 RX ORDER — AMITRIPTYLINE HYDROCHLORIDE 10 MG/1
10 TABLET, FILM COATED ORAL NIGHTLY PRN
Qty: 30 TAB | Refills: 1 | Status: SHIPPED | OUTPATIENT
Start: 2020-04-10 | End: 2020-06-17

## 2020-04-10 ASSESSMENT — PATIENT HEALTH QUESTIONNAIRE - PHQ9: CLINICAL INTERPRETATION OF PHQ2 SCORE: 0

## 2020-04-13 VITALS — BODY MASS INDEX: 22.67 KG/M2 | RESPIRATION RATE: 16 BRPM | WEIGHT: 123.2 LBS | HEIGHT: 62 IN

## 2020-04-13 NOTE — PROGRESS NOTES
Telemedicine Visit: Established Patient     This encounter was conducted via Zoom .   Verbal consent was obtained. Patient's identity was verified.    Subjective:   CC: Bladder tenderness  Sakshi Gibbs is a 29 y.o. female presenting for evaluation and management of:    Chronic but worsening problem.  Patient states for several years she is experienced intermittent episodes of dysuria and bladder discomfort.  Patient is recent seen by me on 2/26/2020 and treated for a urinary tract infection.  Urine culture indicated there was no signs of urinary tract infection.  Patient states after taking antibiotics she feels that symptoms improved but returned 1 week after completing antibiotic course.  States pain is a constant low-grade aching pain that can be anywhere from a 2-5.  States her bladder is tender to palpation.  She denies urgency or frequency.  Admits to intermittent episodes of dysuria.  Denies fever, chills, nausea, vomiting, intentional weight loss, lymphadenopathy, abnormal urethral discharge.  Patient is  and states she is a monogamous relationship.  Denies STIs.  She tells me she has been treating symptoms by drinking lots of water and also drinking cranberry juice.  States she is no longer drinking coffee and is avoiding bladder irritants.  She does mention she feels symptoms exacerbate while she uses her menstrual cup when she is on her menses.  States she is been using her menstrual cup for a couple years but the last 3 months has noticed that it aggravates bladder symptoms.  Patient is inquiring about treatment options.    ROS   Denies any recent fevers or chills. No nausea or vomiting. No chest pains or shortness of breath.     Allergies   Allergen Reactions   • Sulfa Drugs        Current medicines (including changes today)  Current Outpatient Medications   Medication Sig Dispense Refill   • amitriptyline (ELAVIL) 10 MG Tab Take 1 Tab by mouth at bedtime as needed. 30 Tab 1   •  "propranolol (INDERAL) 80 MG Tab Take 80 mg by mouth 3 times a day.     • amphetamine-dextroamphetamine (ADDERALL XR) 20 MG per XR capsule Take 1 Cap by mouth every morning for 30 days. 30 Cap 0   • valACYclovir (VALTREX) 500 MG Tab Take 1 Tab by mouth 2 times a day. 180 Tab 3   • rizatriptan (MAXALT-MLT) 10 MG disintegrating tablet Take 1 Tab by mouth Once PRN for Migraine for up to 1 dose. 27 Tab 3   • ibuprofen (MOTRIN) 600 MG Tab Take 1 Tab by mouth every 6 hours as needed for Mild Pain or Moderate Pain. 20 Tab 1   • Paragard Intrauterine Copper IUD by Intrauterine route.     • Prenatal MV-Min-Fe Fum-FA-DHA (PRENATAL 1 PO) Take  by mouth.       No current facility-administered medications for this visit.        Patient Active Problem List    Diagnosis Date Noted   • Attention deficit hyperactivity disorder (ADHD), predominantly inattentive type 11/01/2017   • Migraine without aura and without status migrainosus, not intractable 11/01/2017   • Recurrent cold sores 11/01/2017       Family History   Problem Relation Age of Onset   • No Known Problems Son    • No Known Problems Daughter        She  has a past medical history of ADHD and Migraine.  She  has a past surgical history that includes primary c section (09/20/2013); ankle arthroscopy (Left, 2013); and repeat c section (N/A, 10/18/2018).       Objective:   Vitals obtained by patient:  Respirations through observation: 16, Height: 5'5\" and Weight: 123lb    Physical Exam:  Constitutional: Alert, no distress, well-groomed.  Skin: No rashes in visible areas.  Eye: Round. Conjunctiva clear, lids normal. No icterus.   ENMT: Lips pink without lesions, good dentition, moist mucous membranes. Phonation normal.  Neck: No masses, no thyromegaly. Moves freely without pain.  CV: Pulse as reported by patient  Respiratory: Unlabored respiratory effort, no cough or audible wheeze  Psych: Alert and oriented x3, normal affect and mood.       Assessment and Plan:   The " following treatment plan was discussed:     1. Chronic bladder pain  2. Dysuria    Lab work has been ordered to further evaluate patient.  Patient be contacted with results.  Patient has been prescribed amitriptyline 10 mg tab once nightly.  Discussed common side effects and adverse reaction of medication with patient.  Patient will follow-up in 2 weeks for reevaluation.  Continue work on hydration.  Avoid bladder irritants.  Patient will follow-up with urology in the near future for further evaluation.    - amitriptyline (ELAVIL) 10 MG Tab; Take 1 Tab by mouth at bedtime as needed.  Dispense: 30 Tab; Refill: 1  - VAGINAL PATHOGENS DNA PANEL; Future  - Chlamydia/GC PCR Urine Or Swab; Future  - URINALYSIS,CULTURE IF INDICATED; Future        Follow-up: Return in about 2 weeks (around 4/24/2020).

## 2020-05-05 ENCOUNTER — HOSPITAL ENCOUNTER (OUTPATIENT)
Dept: LAB | Facility: MEDICAL CENTER | Age: 30
End: 2020-05-05
Attending: PHYSICIAN ASSISTANT
Payer: COMMERCIAL

## 2020-05-05 DIAGNOSIS — R30.0 DYSURIA: ICD-10-CM

## 2020-05-05 DIAGNOSIS — R39.82 CHRONIC BLADDER PAIN: ICD-10-CM

## 2020-05-05 LAB
APPEARANCE UR: CLEAR
BACTERIA #/AREA URNS HPF: NEGATIVE /HPF
BILIRUB UR QL STRIP.AUTO: NEGATIVE
COLOR UR: YELLOW
EPI CELLS #/AREA URNS HPF: NEGATIVE /HPF
GLUCOSE UR STRIP.AUTO-MCNC: NEGATIVE MG/DL
HYALINE CASTS #/AREA URNS LPF: ABNORMAL /LPF
KETONES UR STRIP.AUTO-MCNC: NEGATIVE MG/DL
LEUKOCYTE ESTERASE UR QL STRIP.AUTO: ABNORMAL
MICRO URNS: ABNORMAL
NITRITE UR QL STRIP.AUTO: NEGATIVE
PH UR STRIP.AUTO: 6 [PH] (ref 5–8)
PROT UR QL STRIP: NEGATIVE MG/DL
RBC # URNS HPF: ABNORMAL /HPF
RBC UR QL AUTO: NEGATIVE
SP GR UR STRIP.AUTO: 1.02
UROBILINOGEN UR STRIP.AUTO-MCNC: 0.2 MG/DL
WBC #/AREA URNS HPF: ABNORMAL /HPF

## 2020-05-05 PROCEDURE — 81001 URINALYSIS AUTO W/SCOPE: CPT

## 2020-05-05 PROCEDURE — 87491 CHLMYD TRACH DNA AMP PROBE: CPT

## 2020-05-05 PROCEDURE — 87086 URINE CULTURE/COLONY COUNT: CPT

## 2020-05-05 PROCEDURE — 87591 N.GONORRHOEAE DNA AMP PROB: CPT

## 2020-05-06 ENCOUNTER — TELEMEDICINE (OUTPATIENT)
Dept: MEDICAL GROUP | Facility: PHYSICIAN GROUP | Age: 30
End: 2020-05-06
Payer: COMMERCIAL

## 2020-05-06 DIAGNOSIS — R39.82 CHRONIC BLADDER PAIN: ICD-10-CM

## 2020-05-06 DIAGNOSIS — F90.0 ATTENTION DEFICIT HYPERACTIVITY DISORDER (ADHD), PREDOMINANTLY INATTENTIVE TYPE: ICD-10-CM

## 2020-05-06 PROCEDURE — 99213 OFFICE O/P EST LOW 20 MIN: CPT | Mod: 95,CR | Performed by: PHYSICIAN ASSISTANT

## 2020-05-06 RX ORDER — DEXTROAMPHETAMINE SACCHARATE, AMPHETAMINE ASPARTATE MONOHYDRATE, DEXTROAMPHETAMINE SULFATE AND AMPHETAMINE SULFATE 5; 5; 5; 5 MG/1; MG/1; MG/1; MG/1
20 CAPSULE, EXTENDED RELEASE ORAL EVERY MORNING
Qty: 30 CAP | Refills: 0 | Status: SHIPPED | OUTPATIENT
Start: 2020-07-22 | End: 2020-08-14 | Stop reason: SDUPTHER

## 2020-05-06 RX ORDER — DEXTROAMPHETAMINE SACCHARATE, AMPHETAMINE ASPARTATE MONOHYDRATE, DEXTROAMPHETAMINE SULFATE AND AMPHETAMINE SULFATE 5; 5; 5; 5 MG/1; MG/1; MG/1; MG/1
20 CAPSULE, EXTENDED RELEASE ORAL EVERY MORNING
Qty: 30 CAP | Refills: 0 | Status: SHIPPED | OUTPATIENT
Start: 2020-05-24 | End: 2020-05-06 | Stop reason: SDUPTHER

## 2020-05-06 RX ORDER — DEXTROAMPHETAMINE SACCHARATE, AMPHETAMINE ASPARTATE MONOHYDRATE, DEXTROAMPHETAMINE SULFATE AND AMPHETAMINE SULFATE 5; 5; 5; 5 MG/1; MG/1; MG/1; MG/1
20 CAPSULE, EXTENDED RELEASE ORAL EVERY MORNING
Qty: 30 CAP | Refills: 0 | Status: SHIPPED | OUTPATIENT
Start: 2020-06-22 | End: 2020-05-06 | Stop reason: SDUPTHER

## 2020-05-07 VITALS — BODY MASS INDEX: 23 KG/M2 | WEIGHT: 125 LBS | RESPIRATION RATE: 16 BRPM | HEIGHT: 62 IN

## 2020-05-07 LAB
BACTERIA UR CULT: NORMAL
C TRACH DNA SPEC QL NAA+PROBE: NEGATIVE
N GONORRHOEA DNA SPEC QL NAA+PROBE: NEGATIVE
SIGNIFICANT IND 70042: NORMAL
SITE SITE: NORMAL
SOURCE SOURCE: NORMAL
SPECIMEN SOURCE: NORMAL

## 2020-05-07 NOTE — PROGRESS NOTES
Telemedicine Visit: Established Patient     This encounter was conducted via Zoom .   Verbal consent was obtained. Patient's identity was verified.    Subjective:   CC: Requesting Adderall refill and discuss chronic bladder pain.  Sakshi Gibbs is a 30 y.o. female presenting for evaluation and management of:    Patient is a pleasant 30-year-old female here today discuss chronic bladder pain symptoms.  Symptoms have been present for several years.  Patient experiences intermittent episodes of dysuria, bladder discomfort, and tenderness of bladder with palpation.  Patient describes pain as an intermittent low-grade aching pain that is nonradiating.  She denies urgency or frequency.  She mentions during today's appointment she has discovered that bladder pain symptoms are predominately occur right after her menses and last until ovulation.  She tells me for the past several months she has been using a new menstrual cup during her menses and is wondering if this is exacerbating symptoms.  Last menstrual period was 4/27/2020-5/1/2020.  States pain symptoms developed on 5/4/2020.    Patient has a copper IUD and states she has had 3 copper IUDs.  She admits that she has had complications with IUDs in the past.  She has not followed up with her gynecologist since IUD was placed in December 2018.  During her last appointment on 4/10/2020 patient was prescribed amitriptyline.  She tells me that she is been taking amitriptyline 10 mg tab once nightly for the past 4-5 days with no improvement of pain symptoms.  Patient suffers from chronic migraines and takes propanolol 80 mg tab once daily.  She is inquiring if she needs to continue propanolol if she is going to be taking amitriptyline.  Patient denies drinking coffee and states she avoids bladder irritants.  Recent urine culture and urinalysis indicated no signs of infection.  Chlamydia and gonorrhea lab work has not been resulted.  Denies fever, chills, nausea,  vomiting, intentional weight loss, lymphadenopathy, abnormal urethral discharge.      Patient is also requesting a refill for Adderall.  Patient is currently taking Adderall extended release 20 mg tab once daily.  States she is compliant with medication and experiences no side effects or complications of medication.  She tells me that she takes medication on average 4-5 times a week.  She denies alcohol use or illicit drug use.  Denies headaches, difficulty with sleeping, chest pain, decreased appetite.    ROS   Denies any recent fevers or chills. No nausea or vomiting. No chest pains or shortness of breath.     Allergies   Allergen Reactions   • Sulfa Drugs        Current medicines (including changes today)  Current Outpatient Medications   Medication Sig Dispense Refill   • [START ON 7/22/2020] amphetamine-dextroamphetamine (ADDERALL XR) 20 MG per XR capsule Take 1 Cap by mouth every morning for 30 days. 30 Cap 0   • amitriptyline (ELAVIL) 10 MG Tab Take 1 Tab by mouth at bedtime as needed. 30 Tab 1   • propranolol (INDERAL) 80 MG Tab Take 80 mg by mouth 3 times a day.     • Paragard Intrauterine Copper IUD by Intrauterine route.     • valACYclovir (VALTREX) 500 MG Tab Take 1 Tab by mouth 2 times a day. 180 Tab 3   • rizatriptan (MAXALT-MLT) 10 MG disintegrating tablet Take 1 Tab by mouth Once PRN for Migraine for up to 1 dose. 27 Tab 3   • ibuprofen (MOTRIN) 600 MG Tab Take 1 Tab by mouth every 6 hours as needed for Mild Pain or Moderate Pain. 20 Tab 1   • Prenatal MV-Min-Fe Fum-FA-DHA (PRENATAL 1 PO) Take  by mouth.       No current facility-administered medications for this visit.        Patient Active Problem List    Diagnosis Date Noted   • Attention deficit hyperactivity disorder (ADHD), predominantly inattentive type 11/01/2017   • Migraine without aura and without status migrainosus, not intractable 11/01/2017   • Recurrent cold sores 11/01/2017       Family History   Problem Relation Age of Onset   • No  "Known Problems Son    • No Known Problems Daughter        She  has a past medical history of ADHD and Migraine.  She  has a past surgical history that includes primary c section (09/20/2013); ankle arthroscopy (Left, 2013); and repeat c section (N/A, 10/18/2018).       Objective:   Vitals obtained by patient:   5/6/20 2:28 PM      Resp  16     Weight   56.7 kg (125 lb)     Height  1.575 m (5' 2\")          Physical Exam:  Constitutional: Alert, no distress, well-groomed.  Skin: No rashes in visible areas.  Eye: Round. Conjunctiva clear, lids normal. No icterus.   ENMT: Lips pink without lesions, good dentition, moist mucous membranes. Phonation normal.  Neck: No masses, no thyromegaly. Moves freely without pain.  CV: Pulse as reported by patient  Respiratory: Unlabored respiratory effort, no cough or audible wheeze  Psych: Alert and oriented x3, normal affect and mood.       Assessment and Plan:   The following treatment plan was discussed:     1. Chronic bladder pain  Amitriptyline has been discontinued.  Patient will be following up with urology on May 18, 2020.  Also advised patient to follow-up with her gynecologist.  Advised patient to discontinue using menstrual cup to see if chronic bladder pain symptoms improve.  Patient agreed to plan.  Patient will follow-up in 1 month for reevaluation.  Continue working on hydration.  Avoid known triggers.  Continue to monitor closely.    2. Attention deficit hyperactivity disorder (ADHD), predominantly inattentive type  Chronic but stable problem.  Patient will continue current dose of 20 mg once daily.  Patient will follow-up in 3 months with her PCP for further evaluation.  Narsupriya and  reviewed and appropriate.  Controlled substance agreement is up-to-date.    Follow-up for worsening symptoms,lack of expected recovery, or should new symptoms or problems arise.    - amphetamine-dextroamphetamine (ADDERALL XR) 20 MG per XR capsule; Take 1 Cap by mouth every morning " for 30 days.  Dispense: 30 Cap; Refill: 0  - amphetamine-dextroamphetamine (ADDERALL XR) 20 MG per XR capsule; Take 1 Cap by mouth every morning for 30 days.  Dispense: 30 Cap; Refill: 0  - amphetamine-dextroamphetamine (ADDERALL XR) 20 MG per XR capsule; Take 1 Cap by mouth every morning for 30 days.  Dispense: 30 Cap; Refill: 0         Follow-up: Return in about 1 month (around 6/6/2020).

## 2020-05-28 ENCOUNTER — PATIENT MESSAGE (OUTPATIENT)
Dept: MEDICAL GROUP | Facility: PHYSICIAN GROUP | Age: 30
End: 2020-05-28

## 2020-05-28 RX ORDER — PROPRANOLOL HYDROCHLORIDE 80 MG/1
80 TABLET ORAL 2 TIMES DAILY
Qty: 180 TAB | Refills: 1 | Status: SHIPPED | OUTPATIENT
Start: 2020-05-28 | End: 2020-07-06

## 2020-06-17 ENCOUNTER — OFFICE VISIT (OUTPATIENT)
Dept: MEDICAL GROUP | Facility: PHYSICIAN GROUP | Age: 30
End: 2020-06-17
Payer: COMMERCIAL

## 2020-06-17 ENCOUNTER — HOSPITAL ENCOUNTER (OUTPATIENT)
Dept: RADIOLOGY | Facility: MEDICAL CENTER | Age: 30
End: 2020-06-17
Attending: PHYSICIAN ASSISTANT
Payer: COMMERCIAL

## 2020-06-17 VITALS
HEIGHT: 62 IN | BODY MASS INDEX: 24.48 KG/M2 | TEMPERATURE: 98 F | WEIGHT: 133 LBS | SYSTOLIC BLOOD PRESSURE: 140 MMHG | DIASTOLIC BLOOD PRESSURE: 82 MMHG | OXYGEN SATURATION: 96 % | RESPIRATION RATE: 16 BRPM | HEART RATE: 116 BPM

## 2020-06-17 DIAGNOSIS — R39.82 CHRONIC URINARY BLADDER PAIN: ICD-10-CM

## 2020-06-17 PROCEDURE — 99214 OFFICE O/P EST MOD 30 MIN: CPT | Performed by: PHYSICIAN ASSISTANT

## 2020-06-17 PROCEDURE — 76830 TRANSVAGINAL US NON-OB: CPT

## 2020-06-17 RX ORDER — TRAMADOL HYDROCHLORIDE 50 MG/1
50 TABLET ORAL EVERY 8 HOURS PRN
Qty: 21 TAB | Refills: 0 | Status: SHIPPED | OUTPATIENT
Start: 2020-06-17 | End: 2020-07-06

## 2020-06-17 RX ORDER — DARIFENACIN HYDROBROMIDE 15 MG/1
TABLET, EXTENDED RELEASE ORAL
COMMUNITY
Start: 2020-06-16 | End: 2020-08-14

## 2020-06-17 RX ORDER — TRAMADOL HYDROCHLORIDE 50 MG/1
50 TABLET ORAL EVERY 8 HOURS PRN
Qty: 21 TAB | Refills: 0 | Status: SHIPPED | OUTPATIENT
Start: 2020-06-17 | End: 2020-06-17 | Stop reason: SDUPTHER

## 2020-06-17 ASSESSMENT — PAIN SCALES - GENERAL: PAINLEVEL: 6=MODERATE PAIN

## 2020-06-24 ENCOUNTER — TELEMEDICINE (OUTPATIENT)
Dept: MEDICAL GROUP | Facility: PHYSICIAN GROUP | Age: 30
End: 2020-06-24
Payer: COMMERCIAL

## 2020-06-24 DIAGNOSIS — T83.32XA INTRAUTERINE DEVICE (IUD) MIGRATION, INITIAL ENCOUNTER: ICD-10-CM

## 2020-06-24 DIAGNOSIS — R39.82 CHRONIC BLADDER PAIN: ICD-10-CM

## 2020-06-24 DIAGNOSIS — N83.291 COMPLEX CYST OF RIGHT OVARY: ICD-10-CM

## 2020-06-24 PROCEDURE — 99213 OFFICE O/P EST LOW 20 MIN: CPT | Mod: 95,CR | Performed by: PHYSICIAN ASSISTANT

## 2020-06-24 RX ORDER — AMITRIPTYLINE HYDROCHLORIDE 25 MG/1
TABLET, FILM COATED ORAL DAILY
COMMUNITY
Start: 2020-05-18 | End: 2020-07-06

## 2020-07-06 VITALS — BODY MASS INDEX: 24.48 KG/M2 | WEIGHT: 133 LBS | HEIGHT: 62 IN | RESPIRATION RATE: 16 BRPM

## 2020-07-06 NOTE — PROGRESS NOTES
Telemedicine Visit: Established Patient     This encounter was conducted via Zoom .   Verbal consent was obtained. Patient's identity was verified.    Subjective:   CC: Chronic bladder pain  Sakshi Gibbs is a 30 y.o. female presenting for evaluation and management of:      Patient is a pleasant 30-year-old female here today to follow-up on chronic bladder pain.  She tells me since her last appointment on 6/17/2020 pain symptoms have moderately improved.  She still experiencing mild intermittent bladder pain but symptoms are more manageable.  She tells me that she started her menstrual cycle yesterday and she is experiencing no issues.  States she is no longer using diva cups.  States she has an appoint with her gynecologist in the near future and is also following up with urology.    Discussed recent transabdominal and transvaginal ultrasound results with patient.  Results are as follows:       IMPRESSION:     1.  2.3 cm complex right ovarian cyst is statistically physiologic in a premenopausal female.     2.  Intrauterine device with a lens likely extending slightly into the myometrium.      She denies fever, chills, nausea, vomiting, abnormal urethral discharge, dysuria, hematuria.  Admits to intermittent episodes of pain with urination and with intercourse.      ROS   Denies any recent fevers or chills. No nausea or vomiting. No chest pains or shortness of breath.     Allergies   Allergen Reactions   • Sulfa Drugs        Current medicines (including changes today)  Current Outpatient Medications   Medication Sig Dispense Refill   • darifenacin (ENABLEX) 15 MG SR tablet      • [START ON 7/22/2020] amphetamine-dextroamphetamine (ADDERALL XR) 20 MG per XR capsule Take 1 Cap by mouth every morning for 30 days. 30 Cap 0   • valACYclovir (VALTREX) 500 MG Tab Take 1 Tab by mouth 2 times a day. 180 Tab 3   • rizatriptan (MAXALT-MLT) 10 MG disintegrating tablet Take 1 Tab by mouth Once PRN for Migraine for up  "to 1 dose. 27 Tab 3   • ibuprofen (MOTRIN) 600 MG Tab Take 1 Tab by mouth every 6 hours as needed for Mild Pain or Moderate Pain. 20 Tab 1   • Prenatal MV-Min-Fe Fum-FA-DHA (PRENATAL 1 PO) Take  by mouth.     • Paragard Intrauterine Copper IUD by Intrauterine route.       No current facility-administered medications for this visit.        Patient Active Problem List    Diagnosis Date Noted   • Attention deficit hyperactivity disorder (ADHD), predominantly inattentive type 11/01/2017   • Migraine without aura and without status migrainosus, not intractable 11/01/2017   • Recurrent cold sores 11/01/2017       Family History   Problem Relation Age of Onset   • No Known Problems Son    • No Known Problems Daughter        She  has a past medical history of ADHD and Migraine.  She  has a past surgical history that includes primary c section (09/20/2013); ankle arthroscopy (Left, 2013); and repeat c section (N/A, 10/18/2018).       Objective:   Resp 16   Ht 1.575 m (5' 2\")   Wt 60.3 kg (133 lb) Comment: pt stated  BMI 24.33 kg/m²     Physical Exam:  Constitutional: Alert, no distress, well-groomed.  Skin: No rashes in visible areas.  Eye: Round. Conjunctiva clear, lids normal. No icterus.   ENMT: Lips pink without lesions, good dentition, moist mucous membranes. Phonation normal.  Neck: No masses, no thyromegaly. Moves freely without pain.  CV: Pulse as reported by patient  Respiratory: Unlabored respiratory effort, no cough or audible wheeze  Psych: Alert and oriented x3, normal affect and mood.       Assessment and Plan:   The following treatment plan was discussed:     1. Chronic bladder pain  2. Intrauterine device (IUD) migration, initial encounter  3. Complex cyst of right ovary    Highly emphasized for patient to follow-up with her OB/GYN.  Discussed with patient her intrauterine device likely extending into myometrium and this could be causing current pain symptoms.  She also has a right ovarian cyst that could " also be attribute into current pain symptoms.  Patient states she will be following with her OB/GYN in the near future.  Advised patient to avoid known irritants.      Follow-up for worsening symptoms,lack of expected recovery, or should new symptoms or problems arise.          Other orders  - amitriptyline (ELAVIL) 25 MG Tab; Take  by mouth every day. Take 1 tablet by mouth every day        Follow-up: Return if symptoms worsen or fail to improve.

## 2020-07-06 NOTE — PROGRESS NOTES
Chief Complaint   Patient presents with   • Bladder Problem     pain,recently flare up        HISTORY OF PRESENT ILLNESS: Sakshi Gibbs is an established 30 y.o. female here to discuss the evaluation and management of:    Patient is a pleasant 30-year-old female here today discuss chronic urinary bladder pain.  Patient is in tears during today's appointment.  She tells me urinary bladder pain symptoms have worsened.  States last month she did follow-up with urology and amitriptyline dosage was increased up to 25 mg once daily.  States she felt symptoms might have been improving but then last week she was wearing tighter blue jeans that was tight around her waist.  Dates soon after she developed constant urinary bladder pain.  States it feels like her bladder is on fire.  States pain symptoms are worse right after voiding.  She denies hematuria, urgency, frequency.  States she has been using heat at night with some alleviation symptoms.  States she is tried over-the-counter Azo with no improvement of symptoms.  States she has had a hard time getting a hold of her urologist and is becoming very frustrated.  She tells me that she has been taking Tylenol and 800 mg of ibuprofen twice a day with minimal alleviation symptoms.  States she is avoided all bladder irritants.  She tells me that she has been staying hydrated.  Patient is negative for sexually transmitted infections.  Last day of first menstrual period was on 5/26/2020.  She has discontinued using diva cup felt symptoms improved since doing so but then after wearing the tight chain symptoms exacerbated.      Patient Active Problem List    Diagnosis Date Noted   • Attention deficit hyperactivity disorder (ADHD), predominantly inattentive type 11/01/2017   • Migraine without aura and without status migrainosus, not intractable 11/01/2017   • Recurrent cold sores 11/01/2017       Allergies:Sulfa drugs    Current Outpatient Medications   Medication Sig  Dispense Refill   • amitriptyline (ELAVIL) 25 MG Tab Take  by mouth every day. Take 1 tablet by mouth every day     • darifenacin (ENABLEX) 15 MG SR tablet      • propranolol (INDERAL) 80 MG Tab Take 1 Tab by mouth 2 times a day. (Patient not taking: Reported on 6/24/2020) 180 Tab 1   • [START ON 7/22/2020] amphetamine-dextroamphetamine (ADDERALL XR) 20 MG per XR capsule Take 1 Cap by mouth every morning for 30 days. 30 Cap 0   • Paragard Intrauterine Copper IUD by Intrauterine route.     • valACYclovir (VALTREX) 500 MG Tab Take 1 Tab by mouth 2 times a day. 180 Tab 3   • rizatriptan (MAXALT-MLT) 10 MG disintegrating tablet Take 1 Tab by mouth Once PRN for Migraine for up to 1 dose. 27 Tab 3   • ibuprofen (MOTRIN) 600 MG Tab Take 1 Tab by mouth every 6 hours as needed for Mild Pain or Moderate Pain. 20 Tab 1   • Prenatal MV-Min-Fe Fum-FA-DHA (PRENATAL 1 PO) Take  by mouth.       No current facility-administered medications for this visit.        Social History     Tobacco Use   • Smoking status: Never Smoker   • Smokeless tobacco: Never Used   Substance Use Topics   • Alcohol use: Yes     Comment: soc   • Drug use: No       Family Status   Relation Name Status   • Son  Alive   • Kathryn  Alive     Family History   Problem Relation Age of Onset   • No Known Problems Son    • No Known Problems Daughter        ROS:  Review of Systems   Constitutional: Negative for fever, chills, weight loss and malaise/fatigue.   HENT: Negative for ear pain, nosebleeds, congestion, sore throat and neck pain.    Eyes: Negative for blurred vision.   Respiratory: Negative for cough, sputum production, shortness of breath and wheezing.    Cardiovascular: Negative for chest pain, palpitations, orthopnea and leg swelling.   Gastrointestinal: Negative for heartburn, nausea, vomiting and abdominal pain.   Genitourinary: Negative for dysuria urgency and frequency.  Positive for pelvic pain.  Musculoskeletal: Negative for myalgias, back pain and  "joint pain.   Skin: Negative for rash and itching.   Neurological: Negative for dizziness, tingling, tremors, sensory change, focal weakness and headaches.   Endo/Heme/Allergies: Does not bruise/bleed easily.   Psychiatric/Behavioral: Negative for depression, suicidal ideas and memory loss.  The patient is not nervous/anxious and does not have insomnia.    All other systems reviewed and are negative except as in HPI.    Exam: /82 (BP Location: Left arm, Patient Position: Sitting)   Pulse (!) 116   Temp 36.7 °C (98 °F) (Temporal)   Resp 16   Ht 1.575 m (5' 2\")   Wt 60.3 kg (133 lb)   SpO2 96%  Body mass index is 24.33 kg/m².  General: Normal appearing. No distress.  HEENT: Normocephalic. Eyes conjunctiva clear lids without ptosis, pupils equal and reactive to light accommodation, ears normal shape and contour, canals are clear bilaterally, tympanic membranes are benign, nasal mucosa benign, oropharynx is without erythema, edema or exudates.   Neck: Supple without JVD. Thyroid is not enlarged.  Pulmonary: Clear to ausculation.  Normal effort. No rales, ronchi, or wheezing.  Cardiovascular: Regular rate and rhythm without murmur.   Abdomen: Soft and nondistended. Normal bowel sounds. Liver and spleen are not palpable.  The prepubic region is tender to palpation.  No signs of trauma.  Negative for CVA tenderness.  Neurologic: Grossly nonfocal.  Cranial nerves are normal.   Lymph: No cervical, supraclavicular or axillary lymph nodes are palpable  Skin: Warm and dry.  No rashes or suspicious skin lesions.  Musculoskeletal: Normal gait. No extremity cyanosis, clubbing, or edema.  Psych: Normal mood and affect. Alert and oriented x3. Judgment and insight is normal.    Medical decision-making and discussion:  1. Chronic urinary bladder pain  Pelvic ultrasound has been ordered to further evaluate patient.  Patient be contacted with results.  Given that patient is in severe pain tramadol has been prescribed for " "acute use only.  Advised patient to not drink alcohol, combine other sedating medications or operate machine while taking prescribe medication.  Patient agreed to plan.  Patient will follow-up in 1 week for reevaluation.    Patient understands this prescription is a controlled substance which is potentially habit-forming and its use is regulated by the [place]. We also discussed the new \"black box\" warning regarding the lethal combination of opioids and benzodiazepines. Refills are subject to terms of a controlled substance agreement and patient has an updated one on file. Most recent UDS is appropriate. Any refill requires an office visit. Narcotics have may adverse effects and the risks of addiction, accidental overdose and death were emphasized.       - US-PELVIC COMPLETE (TRANSABDOMINAL/TRANSVAGINAL) (COMBO); Future  - Consent for Opiate Prescription  - tramadol (ULTRAM) 50 MG Tab; Take 1 Tab by mouth every 8 hours as needed for Moderate Pain for up to 7 days. (Patient not taking: Reported on 6/24/2020)  Dispense: 21 Tab; Refill: 0      Please note that this dictation was created using voice recognition software. I have made every reasonable attempt to correct obvious errors, but I expect that there are errors of grammar and possibly content that I did not discover before finalizing the note.    Assessment/Plan:  1. Chronic urinary bladder pain  US-PELVIC COMPLETE (TRANSABDOMINAL/TRANSVAGINAL) (COMBO)    Consent for Opiate Prescription    tramadol (ULTRAM) 50 MG Tab    DISCONTINUED: tramadol (ULTRAM) 50 MG Tab       Return in about 1 week (around 6/24/2020).  "

## 2020-08-14 ENCOUNTER — TELEMEDICINE (OUTPATIENT)
Dept: MEDICAL GROUP | Facility: PHYSICIAN GROUP | Age: 30
End: 2020-08-14
Payer: COMMERCIAL

## 2020-08-14 VITALS — RESPIRATION RATE: 16 BRPM | BODY MASS INDEX: 24.29 KG/M2 | WEIGHT: 132 LBS | HEIGHT: 62 IN | TEMPERATURE: 97.3 F

## 2020-08-14 DIAGNOSIS — F90.0 ATTENTION DEFICIT HYPERACTIVITY DISORDER (ADHD), PREDOMINANTLY INATTENTIVE TYPE: ICD-10-CM

## 2020-08-14 DIAGNOSIS — Z30.011 ENCOUNTER FOR INITIAL PRESCRIPTION OF CONTRACEPTIVE PILLS: ICD-10-CM

## 2020-08-14 PROCEDURE — 99214 OFFICE O/P EST MOD 30 MIN: CPT | Mod: 95,CR | Performed by: PHYSICIAN ASSISTANT

## 2020-08-14 RX ORDER — DEXTROAMPHETAMINE SACCHARATE, AMPHETAMINE ASPARTATE MONOHYDRATE, DEXTROAMPHETAMINE SULFATE AND AMPHETAMINE SULFATE 5; 5; 5; 5 MG/1; MG/1; MG/1; MG/1
20 CAPSULE, EXTENDED RELEASE ORAL EVERY MORNING
Qty: 30 CAP | Refills: 0 | Status: SHIPPED | OUTPATIENT
Start: 2020-10-27 | End: 2020-11-23 | Stop reason: SDUPTHER

## 2020-08-14 RX ORDER — NORGESTIMATE AND ETHINYL ESTRADIOL 0.25-0.035
1 KIT ORAL DAILY
Qty: 84 TAB | Refills: 3 | Status: SHIPPED | OUTPATIENT
Start: 2020-08-14 | End: 2020-11-03

## 2020-08-14 RX ORDER — DEXTROAMPHETAMINE SACCHARATE, AMPHETAMINE ASPARTATE MONOHYDRATE, DEXTROAMPHETAMINE SULFATE AND AMPHETAMINE SULFATE 5; 5; 5; 5 MG/1; MG/1; MG/1; MG/1
20 CAPSULE, EXTENDED RELEASE ORAL EVERY MORNING
Qty: 30 CAP | Refills: 0 | Status: SHIPPED | OUTPATIENT
Start: 2020-08-28 | End: 2020-08-14 | Stop reason: SDUPTHER

## 2020-08-14 RX ORDER — DEXTROAMPHETAMINE SACCHARATE, AMPHETAMINE ASPARTATE MONOHYDRATE, DEXTROAMPHETAMINE SULFATE AND AMPHETAMINE SULFATE 5; 5; 5; 5 MG/1; MG/1; MG/1; MG/1
20 CAPSULE, EXTENDED RELEASE ORAL EVERY MORNING
Qty: 30 CAP | Refills: 0 | Status: SHIPPED | OUTPATIENT
Start: 2020-09-27 | End: 2020-08-14 | Stop reason: SDUPTHER

## 2020-08-14 NOTE — PROGRESS NOTES
Telemedicine Visit: Established Patient     This evaluation was conducted via Zoom, using secure and encrypted videoconferencing technology.  The patient was physical located at Home in [place], [place] and the physician was located in [facility].  The patient was presented by self, at home.  The patient's identity was confirmed and verbal consent for the telemedicine encounter was obtained.      Subjective:   CC: Discuss oral contraceptives and requesting Adderall refill  Sakshi Gibbs is a 30 y.o. female presenting for evaluation and management of:    Encounter for initial prescription of contraceptive pills  Patient recently had an IUFD and it was removed on June 30, 2020.  Patient was experiencing chronic bladder pain symptoms and after IUD has been removed she tells me that symptoms have resolved.  States she is close to follow-up with her gynecologist in the near future for repeat ultrasound.  Patient is  in a monogamous relationship.  Patient wants to prevent from getting pregnant so she would like to start oral contraceptives.  States in the past she was prescribed Sprintec and felt she did well on this oral contraceptive.  Patient would like to be represcribed Sprintec.  She tells me her last menstrual period was on July 21 and lasted 5 days in duration.  She tells me her periods are regular and she experiences mild cramping.  She denies being pregnant.  States when she and her  are sexually active they have been using condoms, coitus interruptus, and tracking ovulation.  Denies personal history or family history of DVT/PEs.  Denies sedentary lifestyle.  Denies smoking.    Attention deficit hyperactivity disorder (ADHD), predominantly inattentive type  Chronic with stable problem.  Patient takes Adderall 1 mg externally capsule once daily for inattention.  States this regimen works well for her.  She is requesting a refill.  Denies side effects or complications medication.  Denies alcohol  "abuse or illicit drug use.  Denies unpleasant side effects from medication such as tachycardia, decreased appetite, unintentional weight loss, irritability.         ROS   Denies any recent fevers or chills. No nausea or vomiting. No chest pains or shortness of breath.     Allergies   Allergen Reactions   • Sulfa Drugs        Current medicines (including changes today)  Current Outpatient Medications   Medication Sig Dispense Refill   • norgestimate-ethinyl estradiol (SPRINTEC 28) 0.25-35 MG-MCG per tablet Take 1 Tab by mouth every day. 84 Tab 3   • [START ON 10/27/2020] amphetamine-dextroamphetamine (ADDERALL XR) 20 MG per XR capsule Take 1 Cap by mouth every morning for 30 days. 30 Cap 0   • valACYclovir (VALTREX) 500 MG Tab Take 1 Tab by mouth 2 times a day. 180 Tab 3   • rizatriptan (MAXALT-MLT) 10 MG disintegrating tablet Take 1 Tab by mouth Once PRN for Migraine for up to 1 dose. 27 Tab 3   • ibuprofen (MOTRIN) 600 MG Tab Take 1 Tab by mouth every 6 hours as needed for Mild Pain or Moderate Pain. 20 Tab 1   • Prenatal MV-Min-Fe Fum-FA-DHA (PRENATAL 1 PO) Take  by mouth.       No current facility-administered medications for this visit.        Patient Active Problem List    Diagnosis Date Noted   • Attention deficit hyperactivity disorder (ADHD), predominantly inattentive type 11/01/2017   • Migraine without aura and without status migrainosus, not intractable 11/01/2017   • Recurrent cold sores 11/01/2017       Family History   Problem Relation Age of Onset   • No Known Problems Son    • No Known Problems Daughter        She  has a past medical history of ADHD and Migraine.  She  has a past surgical history that includes primary c section (09/20/2013); ankle arthroscopy (Left, 2013); and repeat c section (N/A, 10/18/2018).       Objective:   Temp 36.3 °C (97.3 °F) (Oral)   Resp 16   Ht 1.575 m (5' 2\")   Wt 59.9 kg (132 lb) Comment: pt reported  LMP 07/21/2020   Breastfeeding No   BMI 24.14 kg/m² "     Physical Exam:  Constitutional: Alert, no distress, well-groomed.  Skin: No rashes in visible areas.  Eye: Round. Conjunctiva clear, lids normal. No icterus.   ENMT: Lips pink without lesions, good dentition, moist mucous membranes. Phonation normal.  Neck: No masses, no thyromegaly. Moves freely without pain.  CV: Pulse as reported by patient  Respiratory: Unlabored respiratory effort, no cough or audible wheeze  Psych: Alert and oriented x3, normal affect and mood.       Assessment and Plan:   The following treatment plan was discussed:     1. Encounter for initial prescription of contraceptive pills  Discussed OCPs at length. Instructed on importance of taking pill at the same time every day. Use back-up for up to 2 weeks. Discussed risks associated with OCPs including: blood clot, heart attack, and stroke. Advised not to smoke while on hormonal birth control.    Advised patient to begin birth control on the first Sunday after her period begins (because most pill packs are arranged for a Sunday start to avoid withdrawal bleeding on a weekend). Some form of back-up contraception is needed for the first seven days because the full contraceptive effect might not be provided immediately.    Follow-up for worsening symptoms,lack of expected recovery, or should new symptoms or problems arise.      - norgestimate-ethinyl estradiol (SPRINTEC 28) 0.25-35 MG-MCG per tablet; Take 1 Tab by mouth every day.  Dispense: 84 Tab; Refill: 3    2. Attention deficit hyperactivity disorder (ADHD), predominantly inattentive type  PDMP reviwed: No red flags.  Controlled substance up-to-date on file.  Will repeat urine drug screen in the near future.  Chronic but stable problem.  Continue current medication regimen.  Refill has been placed.  Patient follow-up in 3 months reevaluation.  Discussed the importance of taking medication responsibly.      - amphetamine-dextroamphetamine (ADDERALL XR) 20 MG per XR capsule; Take 1 Cap by  mouth every morning for 30 days.  Dispense: 30 Cap; Refill: 0        Follow-up: Return in about 3 months (around 11/14/2020).

## 2020-10-15 DIAGNOSIS — F90.0 ATTENTION DEFICIT HYPERACTIVITY DISORDER (ADHD), PREDOMINANTLY INATTENTIVE TYPE: ICD-10-CM

## 2020-11-03 ENCOUNTER — TELEMEDICINE (OUTPATIENT)
Dept: MEDICAL GROUP | Facility: PHYSICIAN GROUP | Age: 30
End: 2020-11-03
Payer: COMMERCIAL

## 2020-11-03 VITALS — HEIGHT: 62 IN | RESPIRATION RATE: 16 BRPM | BODY MASS INDEX: 24.29 KG/M2 | WEIGHT: 132 LBS

## 2020-11-03 DIAGNOSIS — Z30.41 SURVEILLANCE FOR BIRTH CONTROL, ORAL CONTRACEPTIVES: ICD-10-CM

## 2020-11-03 DIAGNOSIS — F90.0 ATTENTION DEFICIT HYPERACTIVITY DISORDER (ADHD), PREDOMINANTLY INATTENTIVE TYPE: ICD-10-CM

## 2020-11-03 PROCEDURE — 99214 OFFICE O/P EST MOD 30 MIN: CPT | Mod: 95,CR | Performed by: PHYSICIAN ASSISTANT

## 2020-11-03 RX ORDER — LEVONORGESTREL AND ETHINYL ESTRADIOL 6-5-10
1 KIT ORAL DAILY
Qty: 84 TAB | Refills: 2 | Status: SHIPPED | OUTPATIENT
Start: 2020-11-03 | End: 2021-02-19

## 2020-11-03 NOTE — PROGRESS NOTES
Virtual Visit: Established Patient   This visit was conducted via Zoom using secure and encrypted videoconferencing technology. The patient was in a private location in the state of [place].    The patient's identity was confirmed and verbal consent was obtained for this virtual visit.    Subjective:   CC: Bree birth control options and Adderall refill.  Chief Complaint   Patient presents with   • Medication Management       Sakshi Gibbs is a 30 y.o. female presenting for evaluation and management of:    Surveillance for birth control, oral contraceptives  Patient is a pleasant 38-year-old female here today to discuss oral contraceptive management.  Patient states she is currently taking Sprintec but feels she experiences increased irritability, lack of focus, and lack of drive when she is on her menses.  States she does well with her levels of estrogen but progesterone only makes her feel bad.  States on oral contraceptive her menstrual cycle is 21 days in duration and without birth control her cycle is 28 days duration.  States her menses on birth control is 5-6 days duration and positive for light bleeding.  States she variances mild to moderate cramping and will occasionally take over-the-counter Advil with improvement of symptoms.  First day of her last menstrual period was October 8, 2020.  Patient is  and in a monogamous relationship.  She denies being pregnant.  She tells me that she has experienced menstrual migraines and her gynecologist in the past prescribed her estrogen patches to use a few days prior to starting her..  States she still has patches left and this works well for her.        Attention deficit hyperactivity disorder (ADHD), predominantly inattentive type  Chronic with stable problem.  Patient is prescribed Adderall 20 mg release capsule once daily.  States she is compliant with medication experiences no side effects or complications from medication.  States she is finding  that she does not have to take the medication daily at this time.  States she may skip a few days during the workweek since work flow is starting to be more balance.  She feels her menstrual periods exacerbate her a DD symptoms.  Denies difficulty sleeping, chest pain, weight loss, tachycardia, tachypnea, poor appetite.  Denies increase in headaches.  Patient states she does not need a refill until the end of this month.  We will refill medication at that time for patient.    Patient's dose was recently increased to 20 mg daily. She reports good results with this increased dose and would like to continue. Denies headaches, difficulty sleeping, chest pain or poor appetite.      ROS   Denies any recent fevers or chills. No nausea or vomiting. No chest pains or shortness of breath.     Allergies   Allergen Reactions   • Sulfa Drugs        Current medicines (including changes today)  Current Outpatient Medications   Medication Sig Dispense Refill   • Levonorg-Eth Estrad Triphasic 50-30/75-40/ 125-30 MCG Tab Take 1 Tab by mouth every day. 84 Tab 2   • amphetamine-dextroamphetamine (ADDERALL XR) 20 MG per XR capsule Take 1 Cap by mouth every morning for 30 days. 30 Cap 0   • valACYclovir (VALTREX) 500 MG Tab Take 1 Tab by mouth 2 times a day. 180 Tab 3   • rizatriptan (MAXALT-MLT) 10 MG disintegrating tablet Take 1 Tab by mouth Once PRN for Migraine for up to 1 dose. 27 Tab 3   • ibuprofen (MOTRIN) 600 MG Tab Take 1 Tab by mouth every 6 hours as needed for Mild Pain or Moderate Pain. 20 Tab 1   • Prenatal MV-Min-Fe Fum-FA-DHA (PRENATAL 1 PO) Take  by mouth.       No current facility-administered medications for this visit.        Patient Active Problem List    Diagnosis Date Noted   • Attention deficit hyperactivity disorder (ADHD), predominantly inattentive type 11/01/2017   • Migraine without aura and without status migrainosus, not intractable 11/01/2017   • Recurrent cold sores 11/01/2017       Family History  "  Problem Relation Age of Onset   • No Known Problems Son    • No Known Problems Daughter        She  has a past medical history of ADHD and Migraine.  She  has a past surgical history that includes primary c section (09/20/2013); ankle arthroscopy (Left, 2013); and repeat c section (N/A, 10/18/2018).       Objective:   Resp 16   Ht 1.575 m (5' 2\")   Wt 59.9 kg (132 lb)   BMI 24.14 kg/m²     Physical Exam:  Constitutional: Alert, no distress, well-groomed.  Skin: No rashes in visible areas.  Eye: Round. Conjunctiva clear, lids normal. No icterus.   ENMT: Lips pink without lesions, good dentition, moist mucous membranes. Phonation normal.  Neck: No masses, no thyromegaly. Moves freely without pain.  Respiratory: Unlabored respiratory effort, no cough or audible wheeze  Psych: Alert and oriented x3, normal affect and mood.       Assessment and Plan:   The following treatment plan was discussed:     1. Surveillance for birth control, oral contraceptives  Discussed OCPs at length. Instructed on importance of taking pill at the same time every day. Use back-up for up to 2 weeks. Discussed risks associated with OCPs including: blood clot, heart attack, and stroke. Advised not to smoke while on hormonal birth control.    Advised patient to begin birth control on the first Sunday after her period begins (because most pill packs are arranged for a Sunday start to avoid withdrawal bleeding on a weekend). Some form of back-up contraception is needed for the first seven days because the full contraceptive effect might not be provided immediately.    In today's appointment with Sprintec was discontinued.  Patient has been prescribed Levonorg-Eth Estrad Triphasic 50-30/75-40/ 125-30 MCG Tab.    Follow-up in 3 months for evaluation.    - Levonorg-Eth Estrad Triphasic 50-30/75-40/ 125-30 MCG Tab; Take 1 Tab by mouth every day.  Dispense: 84 Tab; Refill: 2    2. Attention deficit hyperactivity disorder (ADHD), predominantly " inattentive type    PDMP.  No red flags.  Patient is not due for refill until 11/27/2020.  Will refill 3 months worth of prescription for patient at that time.  3 months patient will need to repeat urine drug screen.  Controlled substances up-to-date on file.  Continue to monitor.  Bree importance of taking medication as prescribed.  Patient denies misuse of medication.      Follow-up: Return in about 3 months (around 2/3/2021).

## 2020-11-23 DIAGNOSIS — F90.0 ATTENTION DEFICIT HYPERACTIVITY DISORDER (ADHD), PREDOMINANTLY INATTENTIVE TYPE: ICD-10-CM

## 2020-11-23 RX ORDER — DEXTROAMPHETAMINE SACCHARATE, AMPHETAMINE ASPARTATE MONOHYDRATE, DEXTROAMPHETAMINE SULFATE AND AMPHETAMINE SULFATE 5; 5; 5; 5 MG/1; MG/1; MG/1; MG/1
20 CAPSULE, EXTENDED RELEASE ORAL EVERY MORNING
Qty: 30 CAP | Refills: 0 | Status: SHIPPED | OUTPATIENT
Start: 2021-01-25 | End: 2020-11-25 | Stop reason: SDUPTHER

## 2020-11-23 RX ORDER — DEXTROAMPHETAMINE SACCHARATE, AMPHETAMINE ASPARTATE MONOHYDRATE, DEXTROAMPHETAMINE SULFATE AND AMPHETAMINE SULFATE 5; 5; 5; 5 MG/1; MG/1; MG/1; MG/1
20 CAPSULE, EXTENDED RELEASE ORAL EVERY MORNING
Qty: 30 CAP | Refills: 0 | Status: SHIPPED | OUTPATIENT
Start: 2020-11-26 | End: 2020-11-23 | Stop reason: SDUPTHER

## 2020-11-23 RX ORDER — DEXTROAMPHETAMINE SACCHARATE, AMPHETAMINE ASPARTATE MONOHYDRATE, DEXTROAMPHETAMINE SULFATE AND AMPHETAMINE SULFATE 5; 5; 5; 5 MG/1; MG/1; MG/1; MG/1
20 CAPSULE, EXTENDED RELEASE ORAL EVERY MORNING
Qty: 30 CAP | Refills: 0 | Status: SHIPPED | OUTPATIENT
Start: 2020-12-26 | End: 2020-11-23 | Stop reason: SDUPTHER

## 2020-11-25 DIAGNOSIS — F90.0 ATTENTION DEFICIT HYPERACTIVITY DISORDER (ADHD), PREDOMINANTLY INATTENTIVE TYPE: ICD-10-CM

## 2020-11-25 RX ORDER — DEXTROAMPHETAMINE SACCHARATE, AMPHETAMINE ASPARTATE MONOHYDRATE, DEXTROAMPHETAMINE SULFATE AND AMPHETAMINE SULFATE 5; 5; 5; 5 MG/1; MG/1; MG/1; MG/1
20 CAPSULE, EXTENDED RELEASE ORAL EVERY MORNING
Qty: 30 CAP | Refills: 0 | Status: SHIPPED | OUTPATIENT
Start: 2020-11-26 | End: 2020-11-25 | Stop reason: SDUPTHER

## 2020-11-25 RX ORDER — DEXTROAMPHETAMINE SACCHARATE, AMPHETAMINE ASPARTATE MONOHYDRATE, DEXTROAMPHETAMINE SULFATE AND AMPHETAMINE SULFATE 5; 5; 5; 5 MG/1; MG/1; MG/1; MG/1
20 CAPSULE, EXTENDED RELEASE ORAL EVERY MORNING
Qty: 30 CAP | Refills: 0 | Status: SHIPPED | OUTPATIENT
Start: 2021-01-25 | End: 2021-02-19 | Stop reason: SDUPTHER

## 2020-11-25 RX ORDER — DEXTROAMPHETAMINE SACCHARATE, AMPHETAMINE ASPARTATE MONOHYDRATE, DEXTROAMPHETAMINE SULFATE AND AMPHETAMINE SULFATE 5; 5; 5; 5 MG/1; MG/1; MG/1; MG/1
20 CAPSULE, EXTENDED RELEASE ORAL EVERY MORNING
Qty: 30 CAP | Refills: 0 | Status: SHIPPED | OUTPATIENT
Start: 2020-12-26 | End: 2020-11-25 | Stop reason: SDUPTHER

## 2021-02-19 ENCOUNTER — TELEMEDICINE (OUTPATIENT)
Dept: MEDICAL GROUP | Facility: PHYSICIAN GROUP | Age: 31
End: 2021-02-19
Payer: COMMERCIAL

## 2021-02-19 DIAGNOSIS — G43.009 MIGRAINE WITHOUT AURA AND WITHOUT STATUS MIGRAINOSUS, NOT INTRACTABLE: ICD-10-CM

## 2021-02-19 DIAGNOSIS — F90.0 ATTENTION DEFICIT HYPERACTIVITY DISORDER (ADHD), PREDOMINANTLY INATTENTIVE TYPE: ICD-10-CM

## 2021-02-19 PROCEDURE — 99214 OFFICE O/P EST MOD 30 MIN: CPT | Mod: 95,CR | Performed by: PHYSICIAN ASSISTANT

## 2021-02-19 RX ORDER — DEXTROAMPHETAMINE SACCHARATE, AMPHETAMINE ASPARTATE MONOHYDRATE, DEXTROAMPHETAMINE SULFATE AND AMPHETAMINE SULFATE 5; 5; 5; 5 MG/1; MG/1; MG/1; MG/1
20 CAPSULE, EXTENDED RELEASE ORAL EVERY MORNING
Qty: 30 CAPSULE | Refills: 0 | Status: SHIPPED | OUTPATIENT
Start: 2021-02-24 | End: 2021-02-19 | Stop reason: SDUPTHER

## 2021-02-19 RX ORDER — DEXTROAMPHETAMINE SACCHARATE, AMPHETAMINE ASPARTATE MONOHYDRATE, DEXTROAMPHETAMINE SULFATE AND AMPHETAMINE SULFATE 5; 5; 5; 5 MG/1; MG/1; MG/1; MG/1
20 CAPSULE, EXTENDED RELEASE ORAL EVERY MORNING
Qty: 30 CAPSULE | Refills: 0 | Status: SHIPPED | OUTPATIENT
Start: 2021-04-25 | End: 2021-05-25 | Stop reason: SDUPTHER

## 2021-02-19 RX ORDER — DEXTROAMPHETAMINE SACCHARATE, AMPHETAMINE ASPARTATE MONOHYDRATE, DEXTROAMPHETAMINE SULFATE AND AMPHETAMINE SULFATE 5; 5; 5; 5 MG/1; MG/1; MG/1; MG/1
20 CAPSULE, EXTENDED RELEASE ORAL EVERY MORNING
Qty: 30 CAPSULE | Refills: 0 | Status: SHIPPED | OUTPATIENT
Start: 2021-03-26 | End: 2021-02-19 | Stop reason: SDUPTHER

## 2021-02-19 RX ORDER — PROPRANOLOL HYDROCHLORIDE 80 MG/1
80 TABLET ORAL 2 TIMES DAILY
Qty: 180 TABLET | Refills: 2 | Status: SHIPPED | OUTPATIENT
Start: 2021-02-19 | End: 2022-01-03 | Stop reason: SDUPTHER

## 2021-02-19 RX ORDER — NARATRIPTAN 1 MG/1
TABLET ORAL
Qty: 30 TABLET | Refills: 2 | Status: SHIPPED | OUTPATIENT
Start: 2021-02-19 | End: 2021-05-25

## 2021-02-19 RX ORDER — PROPRANOLOL HYDROCHLORIDE 80 MG/1
80 TABLET ORAL DAILY
COMMUNITY
End: 2021-02-19 | Stop reason: SDUPTHER

## 2021-02-19 ASSESSMENT — PATIENT HEALTH QUESTIONNAIRE - PHQ9: CLINICAL INTERPRETATION OF PHQ2 SCORE: 0

## 2021-02-20 NOTE — PROGRESS NOTES
Virtual Visit: Established Patient   This visit was conducted via Zoom using secure and encrypted videoconferencing technology. The patient was in a private location in the state of [place].    The patient's identity was confirmed and verbal consent was obtained for this virtual visit.    Subjective:   CC:   Chief Complaint   Patient presents with   • Medication Refill     adderall   • Other     pt states struggling with birth control and stopped taking OBC in december, but has noticed problems with migraines getting worse now, interested in estrogen patch       Sakshi Gibbs is a 30 y.o. female presenting for evaluation and management of:    Attention deficit hyperactivity disorder (ADHD), predominantly inattentive type  Chronic but stable problem.  Patient is prescribed Adderall 20 mg release capsule once daily.  States she is compliant with medication experiences no side effects or complications from medication.   Denies difficulty sleeping, chest pain, weight loss, tachycardia, tachypnea, poor appetite.    Pt is requesting refills.  Denies misuse or abuse of medication.  PDMP reviewed.  No red flags.     Migraine without aura and without status migrainosus, not intractable  Chronic problem.  Uncontrolled.  Patient states for the past few months her migraine symptoms have exacerbated.  States migraines usually occur prior to starting her menses.  She is also been experiencing intermittent migraines throughout the month.  Is currently taking propanolol 80 mg once daily.  Denies side effects or complications from this medication.  States she is prescribed Maxalt but only takes it as a last resort because it causes unpleasant side effects such as grogginess.  She mentions during today's appointment that she stopped her oral contraceptive because she feels that she just feels better overall without it.  Patient has tried Mirena IUD, copper IUD, and oral contraceptive.  She does tell me for the past few  months she has been using estrogen patches.  States several years ago her OB/GYN had prescribed her estrogen patches to use during her menses to help alleviate menstrual migraines.  States she has been using them for 2 weeks consecutively and then will stop for a bit and then restart the patches.  Patient is inquiring if this is something I can refill.        ROS   Denies any recent fevers or chills. No nausea or vomiting. No chest pains or shortness of breath.     Allergies   Allergen Reactions   • Sulfa Drugs        Current medicines (including changes today)  Current Outpatient Medications   Medication Sig Dispense Refill   • Naratriptan HCl 1 MG Tab Take 1 mg by mouth twice daily 3 days prior to expected onset of symptoms and continue for a total of 6 days 30 tablet 2   • propranolol (INDERAL) 80 MG Tab Take 1 tablet by mouth 2 times a day. Pt takes daily for migraine 180 tablet 2   • [START ON 4/25/2021] amphetamine-dextroamphetamine (ADDERALL XR) 20 MG per XR capsule Take 1 capsule by mouth every morning for 30 days. 30 capsule 0   • valACYclovir (VALTREX) 500 MG Tab Take 1 Tab by mouth 2 times a day. 180 Tab 3   • Prenatal MV-Min-Fe Fum-FA-DHA (PRENATAL 1 PO) Take  by mouth.       No current facility-administered medications for this visit.       Patient Active Problem List    Diagnosis Date Noted   • Attention deficit hyperactivity disorder (ADHD), predominantly inattentive type 11/01/2017   • Migraine without aura and without status migrainosus, not intractable 11/01/2017   • Recurrent cold sores 11/01/2017       Family History   Problem Relation Age of Onset   • No Known Problems Son    • No Known Problems Daughter        She  has a past medical history of ADHD and Migraine.  She  has a past surgical history that includes primary c section (09/20/2013); ankle arthroscopy (Left, 2013); and repeat c section (N/A, 10/18/2018).       Objective:   Temp 37 °C (98.6 °F) (Temporal) Comment: pt stated  Resp 16    "Ht 1.575 m (5' 2\") Comment: pt stated  Wt 59 kg (130 lb) Comment: pt stated  LMP 01/27/2021   BMI 23.78 kg/m²     Physical Exam:  Constitutional: Alert, no distress, well-groomed.  Skin: No rashes in visible areas.  Eye: Round. Conjunctiva clear, lids normal. No icterus.   ENMT: Lips pink without lesions, good dentition, moist mucous membranes. Phonation normal.  Neck: No masses, no thyromegaly. Moves freely without pain.  Respiratory: Unlabored respiratory effort, no cough or audible wheeze  Psych: Alert and oriented x3, normal affect and mood.       Assessment and Plan:   The following treatment plan was discussed:     1. Attention deficit hyperactivity disorder (ADHD), predominantly inattentive type  PDMP.  No red flags.  3 month refill provided.   Controlled substances up-to-date on file.  Continue to monitor.  Discussed importance of taking medication as prescribed.  Patient denies misuse of medication.    2. Migraine without aura and without status migrainosus, not intractable  Discussed with patient I would not be refilling estrogen patches.  This is not standard of care.  Patient has been prescribed nortriptyline and given instructions on how to take medication.  Advised patient take 1 mg by mouth twice daily for 3 days prior to expected onset of symptoms and continue for total of 6 days.   She can also take medication for acute migraine symptoms.  Advised patient she can take 1 to 2.5 mg at onset of migraine symptoms and it does can be administered 4 hours later if symptoms have not improved.  Do not exceed more than 5 mg in a 24-hour..  Common side effects and adverse reaction to medication with patient.  Propanolol has been increased from 80 mg once daily to twice daily.  Advised patient to monitor pulse rate.  Patient will follow up in 1 month for reevaluation.        - Naratriptan HCl 1 MG Tab; Take 1 mg by mouth twice daily 3 days prior to expected onset of symptoms and continue for a total of 6 " days  Dispense: 30 tablet; Refill: 2  - propranolol (INDERAL) 80 MG Tab; Take 1 tablet by mouth 2 times a day. Pt takes daily for migraine  Dispense: 180 tablet; Refill: 2        Follow-up: Return in about 6 weeks (around 4/2/2021).

## 2021-02-22 ENCOUNTER — TELEPHONE (OUTPATIENT)
Dept: MEDICAL GROUP | Facility: PHYSICIAN GROUP | Age: 31
End: 2021-02-22

## 2021-02-22 NOTE — TELEPHONE ENCOUNTER
VOICEMAIL  1. Caller Name: Hailey Brock                    Call Back Number: [phone]    2. Message: Hailey with [facility] trying to confirm pr sent for propranolol is it supposed to be 1 tablet daily or 1 tablet 2x's daily, she is wondering if you can send in a new script with right directions.     3. Patient approves office to leave a detailed voicemail/MyChart message: yes

## 2021-02-23 VITALS — RESPIRATION RATE: 16 BRPM | HEIGHT: 62 IN | WEIGHT: 130 LBS | TEMPERATURE: 98.6 F | BODY MASS INDEX: 23.92 KG/M2

## 2021-03-22 RX ORDER — RIZATRIPTAN BENZOATE 10 MG/1
10 TABLET ORAL
Qty: 10 TABLET | Refills: 3 | Status: SHIPPED | OUTPATIENT
Start: 2021-03-22 | End: 2021-07-26

## 2021-04-07 ENCOUNTER — APPOINTMENT (OUTPATIENT)
Dept: MEDICAL GROUP | Facility: PHYSICIAN GROUP | Age: 31
End: 2021-04-07
Payer: COMMERCIAL

## 2021-05-25 ENCOUNTER — OFFICE VISIT (OUTPATIENT)
Dept: MEDICAL GROUP | Facility: PHYSICIAN GROUP | Age: 31
End: 2021-05-25
Payer: COMMERCIAL

## 2021-05-25 ENCOUNTER — HOSPITAL ENCOUNTER (OUTPATIENT)
Facility: MEDICAL CENTER | Age: 31
End: 2021-05-25
Attending: PHYSICIAN ASSISTANT
Payer: COMMERCIAL

## 2021-05-25 VITALS
DIASTOLIC BLOOD PRESSURE: 80 MMHG | RESPIRATION RATE: 20 BRPM | HEIGHT: 62 IN | TEMPERATURE: 98.5 F | SYSTOLIC BLOOD PRESSURE: 114 MMHG | BODY MASS INDEX: 23.55 KG/M2 | HEART RATE: 84 BPM | OXYGEN SATURATION: 98 % | WEIGHT: 128 LBS

## 2021-05-25 DIAGNOSIS — N76.3 CHRONIC VULVITIS: ICD-10-CM

## 2021-05-25 DIAGNOSIS — Z11.51 SCREENING FOR HPV (HUMAN PAPILLOMAVIRUS): ICD-10-CM

## 2021-05-25 DIAGNOSIS — F90.0 ATTENTION DEFICIT HYPERACTIVITY DISORDER (ADHD), PREDOMINANTLY INATTENTIVE TYPE: ICD-10-CM

## 2021-05-25 DIAGNOSIS — Z01.419 WOMEN'S ANNUAL ROUTINE GYNECOLOGICAL EXAMINATION: ICD-10-CM

## 2021-05-25 DIAGNOSIS — N83.201 RIGHT OVARIAN CYST: ICD-10-CM

## 2021-05-25 PROCEDURE — 88175 CYTOPATH C/V AUTO FLUID REDO: CPT

## 2021-05-25 PROCEDURE — 99395 PREV VISIT EST AGE 18-39: CPT | Performed by: PHYSICIAN ASSISTANT

## 2021-05-25 PROCEDURE — 99000 SPECIMEN HANDLING OFFICE-LAB: CPT | Performed by: PHYSICIAN ASSISTANT

## 2021-05-25 PROCEDURE — 87624 HPV HI-RISK TYP POOLED RSLT: CPT

## 2021-05-25 RX ORDER — DEXTROAMPHETAMINE SACCHARATE, AMPHETAMINE ASPARTATE MONOHYDRATE, DEXTROAMPHETAMINE SULFATE AND AMPHETAMINE SULFATE 7.5; 7.5; 7.5; 7.5 MG/1; MG/1; MG/1; MG/1
30 CAPSULE, EXTENDED RELEASE ORAL EVERY MORNING
Qty: 30 CAPSULE | Refills: 0 | Status: SHIPPED | OUTPATIENT
Start: 2021-07-27 | End: 2021-08-25 | Stop reason: SDUPTHER

## 2021-05-25 RX ORDER — CLOBETASOL PROPIONATE 0.5 MG/G
1 OINTMENT TOPICAL 2 TIMES DAILY
Qty: 60 G | Refills: 1 | Status: SHIPPED | OUTPATIENT
Start: 2021-05-25 | End: 2021-11-03

## 2021-05-25 RX ORDER — DEXTROAMPHETAMINE SACCHARATE, AMPHETAMINE ASPARTATE MONOHYDRATE, DEXTROAMPHETAMINE SULFATE AND AMPHETAMINE SULFATE 7.5; 7.5; 7.5; 7.5 MG/1; MG/1; MG/1; MG/1
30 CAPSULE, EXTENDED RELEASE ORAL EVERY MORNING
Qty: 30 CAPSULE | Refills: 0 | Status: SHIPPED | OUTPATIENT
Start: 2021-05-28 | End: 2021-05-25 | Stop reason: SDUPTHER

## 2021-05-25 RX ORDER — DEXTROAMPHETAMINE SACCHARATE, AMPHETAMINE ASPARTATE MONOHYDRATE, DEXTROAMPHETAMINE SULFATE AND AMPHETAMINE SULFATE 5; 5; 5; 5 MG/1; MG/1; MG/1; MG/1
20 CAPSULE, EXTENDED RELEASE ORAL EVERY MORNING
Qty: 30 CAPSULE | Refills: 0 | Status: SHIPPED | OUTPATIENT
Start: 2021-05-28 | End: 2021-05-25 | Stop reason: SDUPTHER

## 2021-05-25 RX ORDER — DEXTROAMPHETAMINE SACCHARATE, AMPHETAMINE ASPARTATE MONOHYDRATE, DEXTROAMPHETAMINE SULFATE AND AMPHETAMINE SULFATE 7.5; 7.5; 7.5; 7.5 MG/1; MG/1; MG/1; MG/1
30 CAPSULE, EXTENDED RELEASE ORAL EVERY MORNING
Qty: 30 CAPSULE | Refills: 0 | Status: SHIPPED | OUTPATIENT
Start: 2021-06-27 | End: 2021-05-25 | Stop reason: SDUPTHER

## 2021-05-25 RX ORDER — DEXTROAMPHETAMINE SACCHARATE, AMPHETAMINE ASPARTATE MONOHYDRATE, DEXTROAMPHETAMINE SULFATE AND AMPHETAMINE SULFATE 5; 5; 5; 5 MG/1; MG/1; MG/1; MG/1
20 CAPSULE, EXTENDED RELEASE ORAL EVERY MORNING
Qty: 30 CAPSULE | Refills: 0 | Status: SHIPPED | OUTPATIENT
Start: 2021-07-27 | End: 2021-05-25

## 2021-05-25 RX ORDER — DEXTROAMPHETAMINE SACCHARATE, AMPHETAMINE ASPARTATE MONOHYDRATE, DEXTROAMPHETAMINE SULFATE AND AMPHETAMINE SULFATE 5; 5; 5; 5 MG/1; MG/1; MG/1; MG/1
20 CAPSULE, EXTENDED RELEASE ORAL EVERY MORNING
Qty: 30 CAPSULE | Refills: 0 | Status: SHIPPED | OUTPATIENT
Start: 2021-06-27 | End: 2021-05-25 | Stop reason: SDUPTHER

## 2021-05-26 LAB
CYTOLOGY REG CYTOL: NORMAL
HPV HR 12 DNA CVX QL NAA+PROBE: NEGATIVE
HPV16 DNA SPEC QL NAA+PROBE: NEGATIVE
HPV18 DNA SPEC QL NAA+PROBE: NEGATIVE
SPECIMEN SOURCE: NORMAL

## 2021-05-26 NOTE — PROGRESS NOTES
SUBJECTIVE: 31 y.o. female for annual routine gynecologic exam  Chief Complaint   Patient presents with   • Gynecologic Exam   • Medication Refill     Adderall        Attention deficit hyperactivity disorder (ADHD), predominantly inattentive type  Chronic problem.  Could be better controlled.  Patient states in the past several weeks has been having difficulty concentrating and is feeling easily overwhelmed.  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  Patient is currently prescribed Adderall 20 mg extended release once daily.  Denies side effects or complications from medication.  In the past patient was prescribed 30 mg Adderall once daily. Dosage was decreased after pregnancy.  Patient is inquiring if dosage can be increased back to 30 mg once daily.    Denies difficulty sleeping, chest pain, weight loss, tachycardia, tachypnea, poor appetite.    Denies misuse or abuse of medication.  PDMP reviewed.  No red flags.    Right ovarian cyst  On 2020 pelvic ultrasound result indicated a 2.3 cm complex right ovarian cyst.  Patient states she followed up with gynecology but a repeat pelvic ultrasound was not ordered.  Patient states intermittently she does experience right lower quadrant pain and pressure.  Denies other associated symptoms.  Overall is feeling well.  Repeat ultrasound will be ordered.    Chronic vulvitis  Chronic but stable problem.  Patient states that she treats chronic vulvitis with clobetasol.  She uses clobetasol on an as-needed basis.  States medication is usually managed by her gynecologist but she had difficulty scheduling an appointment.  Patient is inquiring if I can refill medication for patient.  She denies side effects or complications from medication.  Symptoms are managed on this regimen.    OB History    Para Term  AB Living   2 2 2 0 0 2   SAB TAB Ectopic Molar Multiple Live Births   0 0 0 0 0 1      Last Pap: 18  Social History      Substance and Sexual Activity   Sexual Activity Yes   • Partners: Male   • Birth control/protection: Pill, Condom, Coitus Interruptus    Comment: .      Sexual history: currently sexually active, single partner, heterosexual   H/O Abnormal Pap no  She  reports that she has never smoked. She has never used smokeless tobacco.        Allergies: Sulfa drugs     ROS:    Reports no menopause symptoms of hot flashes, night sweats, sleep disruption, mood changes.Denies vaginal dryness.   Menses every month with 5 days moderate bleeding   Cramping is mild, moderate.   She does take OTC analgesics for cramping  No significant bloating/fluid retention, pelvic pain, or dyspareunia. No vaginal discharge   No breast tenderness, mass, nipple discharge, changes in size or contour, or abnormal cyclic discomfort.  No urinary tract symptoms, no incontinence, no polydipsia, polyuria,  No abdominal pain, change in bowel habits, black or bloody stools.    No unusual headaches, no visual changes, menstrual migraines   No prolonged cough. No dyspnea or chest pain on exertion.  No depression, labile mood, anxiety, libido changes, insomnia.  No temperature intolerance.  No new/concerning skin lesions, concerns.     Exercise: sporadic irregular exercise  Preventive Care: Pt. Does self breast exams once monthly. Pt. Takes ashwaganda supplement daily.     Current medicines (including changes today)  Current Outpatient Medications   Medication Sig Dispense Refill   • clobetasol (TEMOVATE) 0.05 % Ointment Apply 1 Application topically 2 times a day. 60 g 1   • [START ON 7/27/2021] amphetamine-dextroamphetamine ER (ADDERALL XR) 30 MG XR capsule Take 1 capsule by mouth every morning for 30 days. 30 capsule 0   • valACYclovir (VALTREX) 500 MG Tab TAKE 1 TABLET TWICE A DAY 60 tablet 0   • rizatriptan (MAXALT) 10 MG tablet Take 1 tablet by mouth one time as needed for Migraine. 10 tablet 3   • propranolol (INDERAL) 80 MG Tab Take 1 tablet  "by mouth 2 times a day. Pt takes daily for migraine 180 tablet 2     No current facility-administered medications for this visit.     She  has a past medical history of ADHD and Migraine.  She  has a past surgical history that includes primary c section (09/20/2013); ankle arthroscopy (Left, 2013); and repeat c section (N/A, 10/18/2018).     Family History:   Family History   Problem Relation Age of Onset   • No Known Problems Son    • No Known Problems Daughter        OBJECTIVE:   /80 (BP Location: Left arm, Patient Position: Sitting, BP Cuff Size: Adult)   Pulse 84   Temp 36.9 °C (98.5 °F) (Temporal)   Resp 20   Ht 1.575 m (5' 2\")   Wt 58.1 kg (128 lb)   SpO2 98%   BMI 23.41 kg/m²   Body mass index is 23.41 kg/m².    Exam: /80 (BP Location: Left arm, Patient Position: Sitting, BP Cuff Size: Adult)   Pulse 84   Temp 36.9 °C (98.5 °F) (Temporal)   Resp 20   Ht 1.575 m (5' 2\")   Wt 58.1 kg (128 lb)   SpO2 98%   General: Normal appearing. No distress.  HEENT: Normocephalic. Eyes conjunctiva clear lids without ptosis, pupils equal and reactive to light accommodation, ears normal shape and contour, canals are clear bilaterally, tympanic membranes are benign, nasal mucosa benign, oropharynx is without erythema, edema or exudates.   Neck: Supple without JVD. Thyroid is not enlarged.  Pulmonary: Clear to ausculation.  Normal effort. No rales, ronchi, or wheezing.  Cardiovascular: Regular rate and rhythm without murmur.   Abdomen: Soft, nontender, nondistended. Normal bowel sounds. Liver and spleen are not palpable  Neurologic: Grossly nonfocal.  Cranial nerves are normal.   Lymph: No cervical, supraclavicular or axillary lymph nodes are palpable  Skin: Warm and dry.  No rashes or suspicious skin lesions.  Musculoskeletal: Normal gait. No extremity cyanosis, clubbing, or edema.  Psych: Normal mood and affect. Alert and oriented x3. Judgment and insight is normal.     Breast Exam: Performed with " instruction during examination. No axillary lymphadenopathy, no skin changes, no dominant masses. No nipple retraction  Pelvic Exam -  Normal external genitalia with no lesions. Vaginal Mucosa:  normal vaginal mucosa, normal discharge, no bleeding . Cervix with no visible lesions. No cervical motion tenderness. Uterus is normal sized with no masses. No adnexal tenderness or enlargement appreciated. Thin Prep Pap is obtained, vaginal swab is obtained and specimen(s) sent to lab  Rectal: deferred    <ASSESSMENT and PLAN>    1. Women's annual routine gynecological examination  Thin Prep Pap is obtained, vaginal swab is obtained and specimen(s) sent to lab    - THINPREP PAP WITH HPV; Future    2. Screening for HPV (human papillomavirus)Thin Prep Pap is obtained, vaginal swab is obtained and specimen(s) sent to lab  Thin Prep Pap is obtained, vaginal swab is obtained and specimen(s) sent to lab  - THINPREP PAP WITH HPV; Future    3. Attention deficit hyperactivity disorder (ADHD), predominantly inattentive type  PDMP.  No red flags.  Increase Adderall from 20 mg extended release once daily to 30 mg extended release Once daily.  Patient will follow up in 3 months for reevaluation.   3-month refill provided.  Controlled substance is up-to-date on file.    Patient will be due for an updated controlled substance agreement form during follow-up appointment.    Follow-up for worsening symptoms,lack of expected recovery, or should new symptoms or problems arise.      - amphetamine-dextroamphetamine ER (ADDERALL XR) 30 MG XR capsule; Take 1 capsule by mouth every morning for 30 days.  Dispense: 30 capsule; Refill: 0    4. Right ovarian cyst  Chronic problem.  Appears to be stable.  Repeat ultrasound has been ordered.  Patient be contacted with results.  Discussed ED precautions.    - US-PELVIC COMPLETE (TRANSABDOMINAL/TRANSVAGINAL) (COMBO); Future    5. Chronic vulvitis  Chronic but stable problem.  Continue current medication  regimen.  Avoid known triggers.  Refilled clobetasol.    - clobetasol (TEMOVATE) 0.05 % Ointment; Apply 1 Application topically 2 times a day.  Dispense: 60 g; Refill: 1    1. Women's annual routine gynecological examination  THINPREP PAP WITH HPV   2. Screening for HPV (human papillomavirus)  THINPREP PAP WITH HPV   3. Attention deficit hyperactivity disorder (ADHD), predominantly inattentive type  amphetamine-dextroamphetamine ER (ADDERALL XR) 30 MG XR capsule    DISCONTINUED: amphetamine-dextroamphetamine (ADDERALL XR) 20 MG per XR capsule    DISCONTINUED: amphetamine-dextroamphetamine (ADDERALL XR) 20 MG per XR capsule    DISCONTINUED: amphetamine-dextroamphetamine (ADDERALL XR) 20 MG per XR capsule    DISCONTINUED: amphetamine-dextroamphetamine ER (ADDERALL XR) 30 MG XR capsule    DISCONTINUED: amphetamine-dextroamphetamine ER (ADDERALL XR) 30 MG XR capsule   4. Right ovarian cyst  US-PELVIC COMPLETE (TRANSABDOMINAL/TRANSVAGINAL) (COMBO)   5. Chronic vulvitis  clobetasol (TEMOVATE) 0.05 % Ointment       Discussed  breast self exam, STD prevention, HIV risk factors and prevention, feminine hygiene, adequate intake of calcium and vitamin D, diet and exercise   Follow-up in 3 years for next Gyn exam and 3 years for next Pap.   Next office visit for recheck of chronic medical conditions is due in 3 months

## 2021-06-11 ENCOUNTER — HOSPITAL ENCOUNTER (OUTPATIENT)
Dept: RADIOLOGY | Facility: MEDICAL CENTER | Age: 31
End: 2021-06-11
Attending: PHYSICIAN ASSISTANT
Payer: COMMERCIAL

## 2021-06-11 DIAGNOSIS — N83.201 RIGHT OVARIAN CYST: ICD-10-CM

## 2021-06-11 PROCEDURE — 76830 TRANSVAGINAL US NON-OB: CPT

## 2021-07-19 DIAGNOSIS — B00.1 RECURRENT COLD SORES: ICD-10-CM

## 2021-07-19 RX ORDER — VALACYCLOVIR HYDROCHLORIDE 500 MG/1
TABLET, FILM COATED ORAL
Qty: 60 TABLET | Refills: 2 | Status: SHIPPED | OUTPATIENT
Start: 2021-07-19 | End: 2021-11-11

## 2021-07-27 RX ORDER — RIZATRIPTAN BENZOATE 10 MG/1
TABLET ORAL
Qty: 10 TABLET | Refills: 2 | Status: SHIPPED | OUTPATIENT
Start: 2021-07-27 | End: 2021-11-03

## 2021-08-25 ENCOUNTER — TELEMEDICINE (OUTPATIENT)
Dept: MEDICAL GROUP | Facility: PHYSICIAN GROUP | Age: 31
End: 2021-08-25
Payer: COMMERCIAL

## 2021-08-25 ENCOUNTER — TELEPHONE (OUTPATIENT)
Dept: MEDICAL GROUP | Facility: PHYSICIAN GROUP | Age: 31
End: 2021-08-25

## 2021-08-25 DIAGNOSIS — F90.0 ATTENTION DEFICIT HYPERACTIVITY DISORDER (ADHD), PREDOMINANTLY INATTENTIVE TYPE: ICD-10-CM

## 2021-08-25 DIAGNOSIS — N94.3 PMS (PREMENSTRUAL SYNDROME): ICD-10-CM

## 2021-08-25 PROCEDURE — 99214 OFFICE O/P EST MOD 30 MIN: CPT | Mod: 95,CR | Performed by: PHYSICIAN ASSISTANT

## 2021-08-25 RX ORDER — DEXTROAMPHETAMINE SACCHARATE, AMPHETAMINE ASPARTATE MONOHYDRATE, DEXTROAMPHETAMINE SULFATE AND AMPHETAMINE SULFATE 7.5; 7.5; 7.5; 7.5 MG/1; MG/1; MG/1; MG/1
30 CAPSULE, EXTENDED RELEASE ORAL EVERY MORNING
Qty: 30 CAPSULE | Refills: 0 | Status: SHIPPED | OUTPATIENT
Start: 2021-08-25 | End: 2021-08-25 | Stop reason: SDUPTHER

## 2021-08-25 RX ORDER — DEXTROAMPHETAMINE SACCHARATE, AMPHETAMINE ASPARTATE MONOHYDRATE, DEXTROAMPHETAMINE SULFATE AND AMPHETAMINE SULFATE 7.5; 7.5; 7.5; 7.5 MG/1; MG/1; MG/1; MG/1
30 CAPSULE, EXTENDED RELEASE ORAL EVERY MORNING
Qty: 30 CAPSULE | Refills: 0 | Status: SHIPPED | OUTPATIENT
Start: 2021-09-24 | End: 2021-08-25 | Stop reason: SDUPTHER

## 2021-08-25 RX ORDER — COVID-19 MOLECULAR TEST ASSAY
KIT MISCELLANEOUS
COMMUNITY
Start: 2021-07-11 | End: 2021-08-25

## 2021-08-25 RX ORDER — CITALOPRAM HYDROBROMIDE 10 MG/1
TABLET ORAL
Qty: 30 TABLET | Refills: 2 | Status: SHIPPED | OUTPATIENT
Start: 2021-08-25 | End: 2021-11-11 | Stop reason: SDUPTHER

## 2021-08-25 RX ORDER — DEXTROAMPHETAMINE SACCHARATE, AMPHETAMINE ASPARTATE MONOHYDRATE, DEXTROAMPHETAMINE SULFATE AND AMPHETAMINE SULFATE 7.5; 7.5; 7.5; 7.5 MG/1; MG/1; MG/1; MG/1
30 CAPSULE, EXTENDED RELEASE ORAL EVERY MORNING
Qty: 30 CAPSULE | Refills: 0 | Status: SHIPPED | OUTPATIENT
Start: 2021-10-24 | End: 2021-11-11 | Stop reason: SDUPTHER

## 2021-08-25 NOTE — TELEPHONE ENCOUNTER
Phone Number Called: [phone] (home)     Call outcome: Did not leave a detailed message. Requested patient to call back.    Message: Per PCP last appt, Pt needs to be seen after her next Menstrual cycle.  LVM for Pt to cb and schedule.

## 2021-08-26 NOTE — TELEPHONE ENCOUNTER
Phone Number Called: [phone] (home)     Call outcome: Spoke to patient regarding message below.    Message: Pt informed, tried to schedule, Pt unable to schedule at this time, stated she would schedule on MyChart.  No further action required.

## 2021-09-14 VITALS — RESPIRATION RATE: 16 BRPM | BODY MASS INDEX: 23 KG/M2 | WEIGHT: 125 LBS | HEIGHT: 62 IN

## 2021-09-14 NOTE — PROGRESS NOTES
Virtual Visit: Established Patient   This visit was conducted via Zoom using secure and encrypted videoconferencing technology.   The patient was in a private location in the state of [place].    The patient's identity was confirmed and verbal consent was obtained for this virtual visit.    Subjective:   CC: PMS symptoms and medication refill.  Chief Complaint   Patient presents with   • Premenstrual Syndrome     Stopped BC, sx are getting worse and worse, unregulated temperature for a week prior to mestruals    • Medication Refill     Addrerral Needs to go to the [facility] on file not home order       Sakshi Gibbs is a 31 y.o. female presenting for evaluation and management of:    Attention deficit hyperactivity disorder (ADHD), predominantly inattentive type  Chronic but stable problem.  Patient taking Adderall 30 mg extended release once daily.  Denies side effects or complications medication.  Denies misuse or abuse of medication.  Denies difficulty sleeping, chest pain, weight loss, tachycardia, tachypnea, poor appetite.    PDMP reviewed.  No red flags.  Patient is requesting refill.    PMS (premenstrual syndrome)  Chronic problem.  Uncontrolled.  Patient states she has been off of birth control since last year and p.m. as symptoms.  She tells me it is affecting her quality of life and interfering with daily living activities.  States prior to her menses she will experience hot flashes, chills, exhaustion, and irritability.  Patient does not want to be placed back on oral contraceptive.  In the past patient had an IUD but experienced unpleasant side effects with IUD.  Patient is inquiring about treatment options.      ROS       Current medicines (including changes today)  Current Outpatient Medications   Medication Sig Dispense Refill   • [START ON 10/24/2021] amphetamine-dextroamphetamine ER (ADDERALL XR) 30 MG XR capsule Take 1 Capsule by mouth every morning for 30 days. 30 Capsule 0   • citalopram  "(CELEXA) 10 MG tablet Take 1 tablet by mouth at pms onset and continue for the first 2 days of menses. 30 Tablet 2   • rizatriptan (MAXALT) 10 MG tablet TAKE 1 TABLET ONE TIME AS  NEEDED FOR MIGRAINE 10 tablet 2   • valACYclovir (VALTREX) 500 MG Tab TAKE 1 TABLET TWICE A DAY 60 tablet 2   • clobetasol (TEMOVATE) 0.05 % Ointment Apply 1 Application topically 2 times a day. 60 g 1   • propranolol (INDERAL) 80 MG Tab Take 1 tablet by mouth 2 times a day. Pt takes daily for migraine 180 tablet 2     No current facility-administered medications for this visit.       Patient Active Problem List    Diagnosis Date Noted   • Attention deficit hyperactivity disorder (ADHD), predominantly inattentive type 11/01/2017   • Migraine without aura and without status migrainosus, not intractable 11/01/2017   • Recurrent cold sores 11/01/2017        Objective:   Resp 16   Ht 1.575 m (5' 2\") Comment: Pt reported  Wt 56.7 kg (125 lb) Comment: Pt reported  BMI 22.86 kg/m²     Physical Exam:    Constitutional: Alert, no distress, well-groomed.  Skin: No rashes in visible areas.  Eye: Round. Conjunctiva clear, lids normal. No icterus.   ENMT: Lips pink without lesions, good dentition, moist mucous membranes. Phonation normal.  Neck: No masses, no thyromegaly. Moves freely without pain.  Respiratory: Unlabored respiratory effort, no cough or audible wheeze  Psych: Alert and oriented x3, normal affect and mood.     Assessment and Plan:   The following treatment plan was discussed:     1. Attention deficit hyperactivity disorder (ADHD), predominantly inattentive type  Chronic problem.  Stable.  We will continue to manage.  Refilled Adderall 30 mg extended release for patient.  3 prescriptions were sent times.    PDMP reviewed.  No red flags.  Patient will follow up in 3 months for reevaluation.  - amphetamine-dextroamphetamine ER (ADDERALL XR) 30 MG XR capsule; Take 1 Capsule by mouth every morning for 30 days.  Dispense: 30 Capsule; " Refill: 0    2. PMS (premenstrual syndrome)  Chronic problem.  Uncontrolled.  Patient has been prescribed Celexa 10 mg tab advised take 1 tab by mouth at onset of PMS symptoms and continue for the first 2 days of menses.  Discussed side effects of medication with patient.  Advised patient continue work on diet, exercise, sleep hygiene, hydration, and developing healthy coping mechanisms for stress anxiety.  Patient will follow up in 1 month reevaluation.    Denies any suicidal or homicidal ideation.  Discussed that should the patient have any symptoms they should call suicide prevention hotline or report to the emergency room immediately.    - citalopram (CELEXA) 10 MG tablet; Take 1 tablet by mouth at pms onset and continue for the first 2 days of menses.  Dispense: 30 Tablet; Refill: 2    Follow-up: Return in about 1 month (around 9/25/2021).

## 2021-11-02 DIAGNOSIS — N76.3 CHRONIC VULVITIS: ICD-10-CM

## 2021-11-03 RX ORDER — RIZATRIPTAN BENZOATE 10 MG/1
TABLET ORAL
Qty: 10 TABLET | Refills: 0 | Status: SHIPPED | OUTPATIENT
Start: 2021-11-03 | End: 2021-11-11

## 2021-11-03 RX ORDER — CLOBETASOL PROPIONATE 0.5 MG/G
OINTMENT TOPICAL
Qty: 60 G | Refills: 0 | Status: SHIPPED | OUTPATIENT
Start: 2021-11-03 | End: 2021-11-29

## 2021-11-11 ENCOUNTER — TELEMEDICINE (OUTPATIENT)
Dept: MEDICAL GROUP | Facility: PHYSICIAN GROUP | Age: 31
End: 2021-11-11
Payer: COMMERCIAL

## 2021-11-11 VITALS — TEMPERATURE: 97.6 F | HEIGHT: 62 IN | BODY MASS INDEX: 23 KG/M2 | WEIGHT: 125 LBS

## 2021-11-11 DIAGNOSIS — F90.0 ATTENTION DEFICIT HYPERACTIVITY DISORDER (ADHD), PREDOMINANTLY INATTENTIVE TYPE: ICD-10-CM

## 2021-11-11 DIAGNOSIS — B00.1 RECURRENT COLD SORES: ICD-10-CM

## 2021-11-11 DIAGNOSIS — N94.3 PMS (PREMENSTRUAL SYNDROME): ICD-10-CM

## 2021-11-11 DIAGNOSIS — G43.009 MIGRAINE WITHOUT AURA AND WITHOUT STATUS MIGRAINOSUS, NOT INTRACTABLE: ICD-10-CM

## 2021-11-11 PROCEDURE — 99214 OFFICE O/P EST MOD 30 MIN: CPT | Mod: 95,CR | Performed by: STUDENT IN AN ORGANIZED HEALTH CARE EDUCATION/TRAINING PROGRAM

## 2021-11-11 RX ORDER — DEXTROAMPHETAMINE SACCHARATE, AMPHETAMINE ASPARTATE MONOHYDRATE, DEXTROAMPHETAMINE SULFATE AND AMPHETAMINE SULFATE 7.5; 7.5; 7.5; 7.5 MG/1; MG/1; MG/1; MG/1
30 CAPSULE, EXTENDED RELEASE ORAL EVERY MORNING
Qty: 90 CAPSULE | Refills: 0 | Status: SHIPPED | OUTPATIENT
Start: 2021-11-11 | End: 2022-02-09

## 2021-11-11 RX ORDER — CITALOPRAM HYDROBROMIDE 10 MG/1
TABLET ORAL
Qty: 30 TABLET | Refills: 2 | Status: SHIPPED | OUTPATIENT
Start: 2021-11-11 | End: 2021-11-15 | Stop reason: SDUPTHER

## 2021-11-11 RX ORDER — VALACYCLOVIR HYDROCHLORIDE 500 MG/1
500 TABLET, FILM COATED ORAL 2 TIMES DAILY
Qty: 60 TABLET | Refills: 11 | Status: SHIPPED | OUTPATIENT
Start: 2021-11-11

## 2021-11-11 RX ORDER — RIZATRIPTAN BENZOATE 10 MG/1
10 TABLET, ORALLY DISINTEGRATING ORAL
Qty: 9 TABLET | Refills: 11 | Status: SHIPPED | OUTPATIENT
Start: 2021-11-11

## 2021-11-11 NOTE — PROGRESS NOTES
CC:  Diagnoses of PMS (premenstrual syndrome), Attention deficit hyperactivity disorder (ADHD), predominantly inattentive type, and Recurrent cold sores were pertinent to this visit.    HISTORY OF THE PRESENT ILLNESS: Patient is a 31 y.o. female. This pleasant patient is here today to discuss multiple issues. Her PCP is Victoria Napoles PA-C.    This evaluation was conducted via Zoom using secure and encrypted videoconferencing technology. The patient was in a private location in the [facility].    The patient's identity was confirmed and verbal consent was obtained for this virtual visit.      1.  PMS  Ms. Nowak has suffered from very severe PMS symptoms for many years.  She suffered side effects to ibuprofen in the past.  She has been placed on Celexa 10 mg daily by Victoria Napoles and has found that this is offered some relief.  She is interested in trying a higher dose if indicated.    2.  ADHD  This is a longstanding condition, well controlled on 30 mg of amphetamine-dextroamphetamine ER daily.  Patient is here today for refills.    3.  Recurrent cold sores  Longstanding condition treated with twice daily valacyclovir 500 mg.  Patient is in need of refills.    4.  Migraine without aura and without status migrainous, not intractable  The patient indicates that she seems to get these in clusters.  She finds the Maxalt MLT 10 mg tablets to be effective in aborting these incidents.  She has not tried any B vitamin supplementation.  She is in need of refills.      No problem-specific Assessment & Plan notes found for this encounter.      Current Outpatient Medications Ordered in Epic   Medication Sig Dispense Refill   • citalopram (CELEXA) 10 MG tablet Take 1 tablet by mouth at pms onset and continue for the first 2 days of menses. 30 Tablet 2   • amphetamine-dextroamphetamine ER (ADDERALL XR) 30 MG XR capsule Take 1 Capsule by mouth every morning for 90 days. 90 Capsule 0   • valACYclovir (VALTREX) 500 MG Tab Take 1  "Tablet by mouth 2 times a day. 60 Tablet 11   • rizatriptan (MAXALT-MLT) 10 MG disintegrating tablet Take 1 Tablet by mouth one time as needed for Migraine for up to 1 dose. 9 Tablet 11   • clobetasol (TEMOVATE) 0.05 % Ointment APPLY 1 APPLICATION        TOPICALLY TWO TIMES A DAY 60 g 0   • propranolol (INDERAL) 80 MG Tab Take 1 tablet by mouth 2 times a day. Pt takes daily for migraine 180 tablet 2     No current Epic-ordered facility-administered medications on file.     ROS:   Gen: no fevers/chills, unplanned changes in weight.  She reports no insomnia.  Pulm: no sob, no cough  CV: no chest pain/pressure, no palpitations  GI: no nausea/vomiting, no diarrhea  : no dysuria/nocturia > once per night  MSk: no myalgias  Skin: no rash  Neuro: no headaches, no numbness/tingling  Heme/Lymph: no easy bruising      Objective:     Exam: Temp 36.4 °C (97.6 °F)   Ht 1.575 m (5' 2\")   Wt 56.7 kg (125 lb)  Body mass index is 22.86 kg/m².    General: Normal appearing. No distress.  Neurologic: Grossly nonfocal  Skin: No obvious lesions.  Psych: Normal mood and affect. Alert and oriented x3. Judgment and insight is normal.    Labs:   No pertinent labs    Assessment & Plan:   31 y.o. female with the following -    1. PMS (premenstrual syndrome)  -Chronic, stable.  -I have instructed the patient to increase her dose to 20 mg daily for a 1 month trial to see if this improves symptoms.  - citalopram (CELEXA) 10 MG tablet; Take 1 tablet by mouth at pms onset and continue for the first 2 days of menses.  Dispense: 30 Tablet; Refill: 2    2. Attention deficit hyperactivity disorder (ADHD), predominantly inattentive type  -Chronic, stable.  -Controlled substance agreement on file.  PDMP and reviewed.  - amphetamine-dextroamphetamine ER (ADDERALL XR) 30 MG XR capsule; Take 1 Capsule by mouth every morning for 90 days.  Dispense: 90 Capsule; Refill: 0    3. Recurrent cold sores  -Chronic, stable.  -Valacyclovir 500 mg twice daily.  " Dispense 60, refill 11.    4.  Migraine  -Chronic, stable.  -I recommended B vitamin supplementation.  -Maxalt-MLT 10 mg.  Dispense 9.  Refill 11.    Return in about 3 months (around 2/11/2022).    Please note that this dictation was created using voice recognition software. I have made every reasonable attempt to correct obvious errors, but I expect that there are errors of grammar and possibly content that I did not discover before finalizing the note.    Jameson Lay PA-C 11/11/2021    I have reviewed and agree with history, assessment and plan for office encounter on 11/11/2021 with Advanced Practice Provider: Jameson Lay.  Face to face encounter/direct observation: No  Suggested changes to plan or follow-up: none   Betty Galloway M.D.

## 2021-11-15 DIAGNOSIS — N94.3 PMS (PREMENSTRUAL SYNDROME): ICD-10-CM

## 2021-11-15 RX ORDER — CITALOPRAM HYDROBROMIDE 10 MG/1
TABLET ORAL
Qty: 30 TABLET | Refills: 0 | Status: SHIPPED | OUTPATIENT
Start: 2021-11-15 | End: 2021-11-16 | Stop reason: SDUPTHER

## 2021-11-16 DIAGNOSIS — N94.3 PMS (PREMENSTRUAL SYNDROME): ICD-10-CM

## 2021-11-16 RX ORDER — CITALOPRAM HYDROBROMIDE 10 MG/1
TABLET ORAL
Qty: 30 TABLET | Refills: 0 | Status: SHIPPED | OUTPATIENT
Start: 2021-11-16 | End: 2022-02-14

## 2021-11-17 ENCOUNTER — TELEPHONE (OUTPATIENT)
Dept: MEDICAL GROUP | Facility: PHYSICIAN GROUP | Age: 31
End: 2021-11-17

## 2021-11-17 NOTE — TELEPHONE ENCOUNTER
VOICEMAIL  1. Caller Name: Anisa (Pharmacy nextsocial)  Call Back Number:[phone]    2. Message: Reference #[id number] [facility] called pt. Is requesting 90 day supply for Citalopram 10mg. Please call back with approval or denial or 90 day supply

## 2021-11-21 DIAGNOSIS — N94.3 PMS (PREMENSTRUAL SYNDROME): ICD-10-CM

## 2021-11-22 RX ORDER — CITALOPRAM HYDROBROMIDE 10 MG/1
TABLET ORAL
Qty: 30 TABLET | Refills: 0 | OUTPATIENT
Start: 2021-11-22

## 2021-11-25 DIAGNOSIS — N76.3 CHRONIC VULVITIS: ICD-10-CM

## 2021-11-29 RX ORDER — CLOBETASOL PROPIONATE 0.5 MG/G
OINTMENT TOPICAL
Qty: 60 G | Refills: 0 | Status: SHIPPED | OUTPATIENT
Start: 2021-11-29 | End: 2021-12-20

## 2021-12-19 DIAGNOSIS — N76.3 CHRONIC VULVITIS: ICD-10-CM

## 2021-12-20 RX ORDER — CLOBETASOL PROPIONATE 0.5 MG/G
OINTMENT TOPICAL
Qty: 60 G | Refills: 0 | Status: SHIPPED | OUTPATIENT
Start: 2021-12-20 | End: 2022-01-11

## 2022-01-03 DIAGNOSIS — G43.009 MIGRAINE WITHOUT AURA AND WITHOUT STATUS MIGRAINOSUS, NOT INTRACTABLE: ICD-10-CM

## 2022-01-03 RX ORDER — NORGESTIMATE AND ETHINYL ESTRADIOL 0.25-0.035
1 KIT ORAL DAILY
Qty: 28 TABLET | Refills: 12 | Status: SHIPPED | OUTPATIENT
Start: 2022-01-03 | End: 2022-02-14 | Stop reason: SDUPTHER

## 2022-01-03 RX ORDER — PROPRANOLOL HYDROCHLORIDE 80 MG/1
80 TABLET ORAL 2 TIMES DAILY
Qty: 180 TABLET | Refills: 0 | Status: SHIPPED | OUTPATIENT
Start: 2022-01-03 | End: 2022-05-26 | Stop reason: SDUPTHER

## 2022-01-10 DIAGNOSIS — N76.3 CHRONIC VULVITIS: ICD-10-CM

## 2022-01-11 RX ORDER — CLOBETASOL PROPIONATE 0.5 MG/G
OINTMENT TOPICAL
Qty: 60 G | Refills: 0 | Status: SHIPPED | OUTPATIENT
Start: 2022-01-11

## 2022-02-14 ENCOUNTER — OFFICE VISIT (OUTPATIENT)
Dept: MEDICAL GROUP | Facility: PHYSICIAN GROUP | Age: 32
End: 2022-02-14
Payer: COMMERCIAL

## 2022-02-14 VITALS
RESPIRATION RATE: 16 BRPM | HEIGHT: 62 IN | DIASTOLIC BLOOD PRESSURE: 60 MMHG | BODY MASS INDEX: 22.49 KG/M2 | WEIGHT: 122.2 LBS | HEART RATE: 68 BPM | OXYGEN SATURATION: 100 % | TEMPERATURE: 98.5 F | SYSTOLIC BLOOD PRESSURE: 112 MMHG

## 2022-02-14 DIAGNOSIS — F90.0 ATTENTION DEFICIT HYPERACTIVITY DISORDER (ADHD), PREDOMINANTLY INATTENTIVE TYPE: ICD-10-CM

## 2022-02-14 DIAGNOSIS — Z23 NEED FOR VACCINATION: ICD-10-CM

## 2022-02-14 PROCEDURE — 99213 OFFICE O/P EST LOW 20 MIN: CPT | Mod: 25 | Performed by: STUDENT IN AN ORGANIZED HEALTH CARE EDUCATION/TRAINING PROGRAM

## 2022-02-14 RX ORDER — DEXTROAMPHETAMINE SACCHARATE, AMPHETAMINE ASPARTATE MONOHYDRATE, DEXTROAMPHETAMINE SULFATE AND AMPHETAMINE SULFATE 7.5; 7.5; 7.5; 7.5 MG/1; MG/1; MG/1; MG/1
30 CAPSULE, EXTENDED RELEASE ORAL EVERY MORNING
Qty: 90 CAPSULE | Refills: 0 | Status: SHIPPED | OUTPATIENT
Start: 2022-02-14 | End: 2022-05-09 | Stop reason: SDUPTHER

## 2022-02-14 RX ORDER — NORGESTIMATE AND ETHINYL ESTRADIOL 0.25-0.035
1 KIT ORAL DAILY
Qty: 84 TABLET | Refills: 3 | Status: SHIPPED | OUTPATIENT
Start: 2022-02-14 | End: 2022-05-09

## 2022-02-14 ASSESSMENT — PATIENT HEALTH QUESTIONNAIRE - PHQ9: CLINICAL INTERPRETATION OF PHQ2 SCORE: 0

## 2022-02-15 PROCEDURE — 90686 IIV4 VACC NO PRSV 0.5 ML IM: CPT | Performed by: STUDENT IN AN ORGANIZED HEALTH CARE EDUCATION/TRAINING PROGRAM

## 2022-02-15 PROCEDURE — 90471 IMMUNIZATION ADMIN: CPT | Performed by: STUDENT IN AN ORGANIZED HEALTH CARE EDUCATION/TRAINING PROGRAM

## 2022-02-15 NOTE — PROGRESS NOTES
CC:  Diagnoses of Attention deficit hyperactivity disorder (ADHD), predominantly inattentive type and Need for vaccination were pertinent to this visit.    HISTORY OF THE PRESENT ILLNESS: Patient is a 31 y.o. female. This pleasant patient is here today for medication refills.    1.  ADHD, predominantly inattentive type.  Longstanding.  The patient currently treats with Adderall XR 30 mg daily.  She feels that this helps her perform tasks in a more efficient linear fashion and also helps her organize when required to do multiple things at the same time.  She reports no insomnia and that she is able to maintain a stable weight on this medication.    No problem-specific Assessment & Plan notes found for this encounter.      Current Outpatient Medications Ordered in Epic   Medication Sig Dispense Refill   • amphetamine-dextroamphetamine ER (ADDERALL XR) 30 MG XR capsule Take 1 Capsule by mouth every morning for 90 days. 90 Capsule 0   • norgestimate-ethinyl estradiol (ORTHO-CYCLEN) 0.25-35 MG-MCG per tablet Take 1 Tablet by mouth every day. 84 Tablet 3   • propranolol (INDERAL) 80 MG Tab Take 1 Tablet by mouth 2 times a day. Pt takes daily for migraine 180 Tablet 0   • valACYclovir (VALTREX) 500 MG Tab Take 1 Tablet by mouth 2 times a day. 60 Tablet 11   • rizatriptan (MAXALT-MLT) 10 MG disintegrating tablet Take 1 Tablet by mouth one time as needed for Migraine for up to 1 dose. 9 Tablet 11   • clobetasol (TEMOVATE) 0.05 % Ointment APPLY 1 APPLICATION        TOPICALLY TWO TIMES A DAY 60 g 0     No current Epic-ordered facility-administered medications on file.         ROS:   Gen: no fevers/chills, unplanned changes in weight  Eyes: no changes in vision  ENT: no sore throat, no hearing loss, no bloody nose  Pulm: no sob, no cough  CV: no chest pain/pressure, no palpitations  GI: no nausea/vomiting, no diarrhea  : no dysuria/nocturia > once per night  MSk: no myalgias  Skin: no rash  Neuro: no headaches, no  "numbness/tingling  Heme/Lymph: no easy bruising      Objective:     Exam: /60 (BP Location: Left arm, Patient Position: Sitting, BP Cuff Size: Adult)   Pulse 68   Temp 36.9 °C (98.5 °F) (Temporal)   Resp 16   Ht 1.575 m (5' 2\")   Wt 55.4 kg (122 lb 3.2 oz)   SpO2 100%  Body mass index is 22.35 kg/m².    General: Normal appearing. No distress.  Neurologic: Grossly nonfocal  Skin: No obvious lesions.  Psych: Normal mood and affect. Alert and oriented x3. Judgment and insight is normal.    A chaperone was offered to the patient during today's exam. Patient declined chaperone.    Labs:   No pertinent labs    Assessment & Plan:   31 y.o. female with the following -    1.  ADHD, predominantly inattentive type.  -Chronic, stable.  Well treated without side effect.  -Continue Adderall XR 30 mg daily.  Dispense 90.  Refill 0.    2.  Need for vaccination  -Influenza vaccine provided in clinic today.    Return in about 3 months (around 5/14/2022).    Please note that this dictation was created using voice recognition software. I have made every reasonable attempt to correct obvious errors, but I expect that there are errors of grammar and possibly content that I did not discover before finalizing the note.    Jameson Lay PA-C 2/14/2022  "

## 2022-03-29 ENCOUNTER — TELEMEDICINE (OUTPATIENT)
Dept: MEDICAL GROUP | Facility: PHYSICIAN GROUP | Age: 32
End: 2022-03-29
Payer: COMMERCIAL

## 2022-03-29 VITALS — BODY MASS INDEX: 21.16 KG/M2 | TEMPERATURE: 97.4 F | WEIGHT: 115 LBS | HEIGHT: 62 IN

## 2022-03-29 DIAGNOSIS — N30.10 CHRONIC INTERSTITIAL CYSTITIS: ICD-10-CM

## 2022-03-29 PROCEDURE — 99213 OFFICE O/P EST LOW 20 MIN: CPT | Mod: 95 | Performed by: STUDENT IN AN ORGANIZED HEALTH CARE EDUCATION/TRAINING PROGRAM

## 2022-03-29 RX ORDER — HYDROXYZINE HYDROCHLORIDE 25 MG/1
25 TABLET, FILM COATED ORAL 3 TIMES DAILY
Qty: 90 TABLET | Refills: 0 | Status: SHIPPED | OUTPATIENT
Start: 2022-03-29 | End: 2022-04-25

## 2022-03-30 NOTE — PROGRESS NOTES
CC:  The encounter diagnosis was Chronic interstitial cystitis.     This evaluation was conducted via Zoom using secure and encrypted videoconferencing technology. The patient was in their home in the state of [place].    The patient's identity was confirmed and verbal consent was obtained for this virtual visit.      HISTORY OF THE PRESENT ILLNESS: Patient is a 31 y.o. female. This pleasant patient is here today to discuss continued pain from her interstitial cystitis.    Most recent episode of pain in his secondary to try and 1 alcoholic beverage to see if this would aggravate her interstitial cystitis.  Apparently it did.  Patient had a poor experience with urology [place] in the past and was not able to follow-up on treatment with them.  She had attempted treatment with amitriptyline 10 mg daily she believes the trial lasted about 2 months without much relief.     She continues to have significant daily pain.  She has recently been worked up for a UTI at urgent care and found to be negative.    No problem-specific Assessment & Plan notes found for this encounter.      Current Outpatient Medications Ordered in Epic   Medication Sig Dispense Refill   • hydrOXYzine HCl (ATARAX) 25 MG Tab Take 1 Tablet by mouth in the morning, at noon, and at bedtime. 90 Tablet 0   • amphetamine-dextroamphetamine ER (ADDERALL XR) 30 MG XR capsule Take 1 Capsule by mouth every morning for 90 days. 90 Capsule 0   • norgestimate-ethinyl estradiol (ORTHO-CYCLEN) 0.25-35 MG-MCG per tablet Take 1 Tablet by mouth every day. 84 Tablet 3   • clobetasol (TEMOVATE) 0.05 % Ointment APPLY 1 APPLICATION        TOPICALLY TWO TIMES A DAY 60 g 0   • propranolol (INDERAL) 80 MG Tab Take 1 Tablet by mouth 2 times a day. Pt takes daily for migraine 180 Tablet 0   • valACYclovir (VALTREX) 500 MG Tab Take 1 Tablet by mouth 2 times a day. 60 Tablet 11   • rizatriptan (MAXALT-MLT) 10 MG disintegrating tablet Take 1 Tablet by mouth one time as needed for  "Migraine for up to 1 dose. 9 Tablet 11     No current [facility]-ordered facility-administered medications on file.     ROS:   Gen: no fevers/chills, unplanned changes in weight  : See HPI.      Objective:     Exam: Temp 36.3 °C (97.4 °F) (Temporal)   Ht 1.575 m (5' 2\")   Wt 52.2 kg (115 lb)  Body mass index is 21.03 kg/m².    General: Normal appearing. No distress.  Neurologic: Grossly nonfocal  Skin: No obvious lesions.    Labs:   No pertinent labs available.  Patient's UA was at a nonassociated facility.    Assessment & Plan:   31 y.o. female with the following -    1. Chronic interstitial cystitis  - Chronic, in exacerbation.  - Trial with hydroxyzine 25 mg 3 times daily.  Dispense 90.  Refill 0.  - Referral to Urology    Return if symptoms worsen or fail to improve.    Please note that this dictation was created using voice recognition software. I have made every reasonable attempt to correct obvious errors, but I expect that there are errors of grammar and possibly content that I did not discover before finalizing the note.    Jameson Lay PA-C 3/29/2022  "

## 2022-04-25 RX ORDER — HYDROXYZINE HYDROCHLORIDE 25 MG/1
TABLET, FILM COATED ORAL
Qty: 90 TABLET | Refills: 0 | Status: SHIPPED | OUTPATIENT
Start: 2022-04-25 | End: 2022-05-26

## 2022-05-09 ENCOUNTER — TELEMEDICINE (OUTPATIENT)
Dept: MEDICAL GROUP | Facility: PHYSICIAN GROUP | Age: 32
End: 2022-05-09
Payer: COMMERCIAL

## 2022-05-09 VITALS — WEIGHT: 115 LBS | TEMPERATURE: 97.8 F | BODY MASS INDEX: 21.03 KG/M2

## 2022-05-09 DIAGNOSIS — L70.0 ACNE VULGARIS: ICD-10-CM

## 2022-05-09 DIAGNOSIS — F90.0 ATTENTION DEFICIT HYPERACTIVITY DISORDER (ADHD), PREDOMINANTLY INATTENTIVE TYPE: ICD-10-CM

## 2022-05-09 PROCEDURE — 99214 OFFICE O/P EST MOD 30 MIN: CPT | Mod: 95 | Performed by: STUDENT IN AN ORGANIZED HEALTH CARE EDUCATION/TRAINING PROGRAM

## 2022-05-09 RX ORDER — DEXTROAMPHETAMINE SACCHARATE, AMPHETAMINE ASPARTATE MONOHYDRATE, DEXTROAMPHETAMINE SULFATE AND AMPHETAMINE SULFATE 7.5; 7.5; 7.5; 7.5 MG/1; MG/1; MG/1; MG/1
30 CAPSULE, EXTENDED RELEASE ORAL EVERY MORNING
Qty: 40 CAPSULE | Refills: 0 | Status: SHIPPED | OUTPATIENT
Start: 2022-05-18 | End: 2022-06-27

## 2022-05-09 RX ORDER — DROSPIRENONE AND ETHINYL ESTRADIOL 0.03MG-3MG
1 KIT ORAL DAILY
Qty: 84 TABLET | Refills: 2 | Status: SHIPPED | OUTPATIENT
Start: 2022-05-09

## 2022-05-09 RX ORDER — PHENAZOPYRIDINE HYDROCHLORIDE 200 MG/1
TABLET, FILM COATED ORAL
COMMUNITY
Start: 2022-03-26

## 2022-05-09 RX ORDER — NITROFURANTOIN 25; 75 MG/1; MG/1
CAPSULE ORAL
COMMUNITY
Start: 2022-03-26

## 2022-05-09 NOTE — PROGRESS NOTES
CC:  Diagnoses of Attention deficit hyperactivity disorder (ADHD), predominantly inattentive type and Acne vulgaris were pertinent to this visit.    HISTORY OF THE PRESENT ILLNESS: Patient is a 32 y.o. female. This pleasant patient is here today for:    1.  ADHD  She reports her symptoms are at baseline, well controlled by her current medication regimen.  No difficulties with insomnia or weight maintenance.  She will be moving to [place] shortly therefore we spent some time figuring out how to best prescribe her medications to prevent gaps.    2.  Acne vulgaris  Patient feels that her skin is suboptimal with her current birth control regimen.  She would like to try a different option to see if this alleviates some of her symptoms.      This evaluation was conducted via Zoom using secure and encrypted videoconferencing technology. The patient was in a private location outside of their home in the [facility].    The patient's identity was confirmed and verbal consent was obtained for this virtual visit.    No problem-specific Assessment & Plan notes found for this encounter.      Current Outpatient Medications Ordered in Epic   Medication Sig Dispense Refill   • [START ON 5/18/2022] amphetamine-dextroamphetamine ER (ADDERALL XR) 30 MG XR capsule Take 1 Capsule by mouth every morning for 40 days. 40 Capsule 0   • drospirenone-ethinyl estradiol (RYLAN) 3-0.03 MG per tablet Take 1 Tablet by mouth every day. 84 Tablet 2   • hydrOXYzine HCl (ATARAX) 25 MG Tab TAKE 1 TABLET BY MOUTH THREE TIMES DAILY, IN THE MORNING, AT NOON, AND AT BEDTIME 90 Tablet 0   • propranolol (INDERAL) 80 MG Tab Take 1 Tablet by mouth 2 times a day. Pt takes daily for migraine 180 Tablet 0   • valACYclovir (VALTREX) 500 MG Tab Take 1 Tablet by mouth 2 times a day. 60 Tablet 11   • rizatriptan (MAXALT-MLT) 10 MG disintegrating tablet Take 1 Tablet by mouth one time as needed for Migraine for up to 1 dose. 9 Tablet 11   • nitrofurantoin  (MACROBID) 100 MG Cap      • phenazopyridine (PYRIDIUM) 200 MG Tab TAKE 1 TABLET BY MOUTH THREE TIMES DAILY UNTIL DIRECTED TO STOP (Patient not taking: Reported on 5/9/2022)     • clobetasol (TEMOVATE) 0.05 % Ointment APPLY 1 APPLICATION        TOPICALLY TWO TIMES A DAY 60 g 0     No current Epic-ordered facility-administered medications on file.     ROS:   Gen: no fevers/chills, unplanned changes in weight  See HPI.    Objective:     Exam: Temp 36.6 °C (97.8 °F)   Wt 52.2 kg (115 lb)  Body mass index is 21.03 kg/m².    General: Normal appearing. No distress.  Neurologic: Grossly nonfocal by passive exam.  Skin: No obvious lesions.  Psych: Normal mood and affect. Alert and oriented x3. Judgment and insight is normal.    Labs:   No pertinent labs.    Assessment & Plan:   32 y.o. female with the following -    1.  ADHD  -Chronic, stable.  -PDMP reviewed.  -Adderall XR 30 mg capsule to be taken once daily.  Dispense 45.  Refill 0.    2.  Acne vulgaris  -Chronic, in exacerbation.  -DC current OCP  -Trial with he has been 3-0.03 mg tablets.  Take 1 daily.  Dispense 84.  Refill 2.    Return in about 7 weeks (around 6/29/2022).    Please note that this dictation was created using voice recognition software. I have made every reasonable attempt to correct obvious errors, but I expect that there are errors of grammar and possibly content that I did not discover before finalizing the note.    Jameson Lay PA-C 5/9/2022

## 2022-05-26 DIAGNOSIS — G43.009 MIGRAINE WITHOUT AURA AND WITHOUT STATUS MIGRAINOSUS, NOT INTRACTABLE: ICD-10-CM

## 2022-05-26 RX ORDER — HYDROXYZINE HYDROCHLORIDE 25 MG/1
TABLET, FILM COATED ORAL
Qty: 90 TABLET | Refills: 0 | Status: SHIPPED | OUTPATIENT
Start: 2022-05-26

## 2022-05-26 RX ORDER — PROPRANOLOL HYDROCHLORIDE 80 MG/1
80 TABLET ORAL 2 TIMES DAILY
Qty: 180 TABLET | Refills: 3 | Status: SHIPPED | OUTPATIENT
Start: 2022-05-26

## 2022-06-22 ENCOUNTER — TELEPHONE (OUTPATIENT)
Dept: MEDICAL GROUP | Facility: PHYSICIAN GROUP | Age: 32
End: 2022-06-22
Payer: COMMERCIAL

## 2022-06-22 NOTE — TELEPHONE ENCOUNTER
Phone Number Called: [phone] (home)     Call outcome: Spoke to patient regarding message below.    Message: Pt stated she has scheduled at [facility] to be seen today for x-ray and next steps.

## 2022-06-22 NOTE — TELEPHONE ENCOUNTER
----- Message from Jameson Lay P.A.-C. sent at 6/22/2022  9:47 AM PDT -----  Please call Ms. Gibbs and see if she is going to urgent care or if she would prefer to have a scheduled appointment in our office.  You may schedule her before or after normal office hours, during lunchtime or during blocktime if necessary to get her in urgently.    Jameson Lay PA-C

## 2022-06-29 ENCOUNTER — TELEMEDICINE (OUTPATIENT)
Dept: MEDICAL GROUP | Facility: PHYSICIAN GROUP | Age: 32
End: 2022-06-29
Payer: COMMERCIAL

## 2022-06-29 VITALS — WEIGHT: 125 LBS | HEIGHT: 62 IN | TEMPERATURE: 97.6 F | BODY MASS INDEX: 23 KG/M2

## 2022-06-29 DIAGNOSIS — F90.0 ATTENTION DEFICIT HYPERACTIVITY DISORDER (ADHD), PREDOMINANTLY INATTENTIVE TYPE: ICD-10-CM

## 2022-06-29 PROCEDURE — 99213 OFFICE O/P EST LOW 20 MIN: CPT | Mod: 95 | Performed by: STUDENT IN AN ORGANIZED HEALTH CARE EDUCATION/TRAINING PROGRAM

## 2022-06-29 RX ORDER — KETOROLAC TROMETHAMINE 10 MG/1
TABLET, FILM COATED ORAL
COMMUNITY

## 2022-06-29 RX ORDER — AMITRIPTYLINE HYDROCHLORIDE 25 MG/1
TABLET, FILM COATED ORAL
COMMUNITY

## 2022-06-29 RX ORDER — DARIFENACIN HYDROBROMIDE 15 MG/1
TABLET, EXTENDED RELEASE ORAL
COMMUNITY

## 2022-06-29 RX ORDER — DEXTROAMPHETAMINE SACCHARATE, AMPHETAMINE ASPARTATE MONOHYDRATE, DEXTROAMPHETAMINE SULFATE AND AMPHETAMINE SULFATE 7.5; 7.5; 7.5; 7.5 MG/1; MG/1; MG/1; MG/1
30 CAPSULE, EXTENDED RELEASE ORAL EVERY MORNING
Qty: 90 CAPSULE | Refills: 0 | Status: SHIPPED | OUTPATIENT
Start: 2022-06-29 | End: 2022-09-27

## 2022-06-29 RX ORDER — KETOROLAC TROMETHAMINE 10 MG/1
TABLET, FILM COATED ORAL
COMMUNITY
Start: 2022-06-02

## 2022-06-29 RX ORDER — DARIFENACIN HYDROBROMIDE 15 MG/1
15 TABLET, EXTENDED RELEASE ORAL
COMMUNITY
Start: 2022-06-02

## 2022-06-29 RX ORDER — RIZATRIPTAN BENZOATE 10 MG/1
TABLET ORAL
COMMUNITY

## 2022-06-29 RX ORDER — CITALOPRAM HYDROBROMIDE 10 MG/1
TABLET ORAL
COMMUNITY

## 2022-06-29 RX ORDER — NORGESTIMATE AND ETHINYL ESTRADIOL 0.25-0.035
KIT ORAL
COMMUNITY
Start: 2022-06-11

## 2022-06-29 RX ORDER — DEXTROAMPHETAMINE SACCHARATE, AMPHETAMINE ASPARTATE MONOHYDRATE, DEXTROAMPHETAMINE SULFATE AND AMPHETAMINE SULFATE 7.5; 7.5; 7.5; 7.5 MG/1; MG/1; MG/1; MG/1
CAPSULE, EXTENDED RELEASE ORAL
COMMUNITY
End: 2022-06-29 | Stop reason: SDUPTHER

## 2022-06-29 RX ORDER — NORGESTIMATE AND ETHINYL ESTRADIOL 0.25-0.035
KIT ORAL
COMMUNITY

## 2022-06-29 NOTE — PROGRESS NOTES
CC:  The encounter diagnosis was Attention deficit hyperactivity disorder (ADHD), predominantly inattentive type.     This evaluation was conducted via Zoom using secure and encrypted videoconferencing technology. The patient was in their home in the [facility].    The patient's identity was confirmed and verbal consent was obtained for this virtual visit.      HISTORY OF THE PRESENT ILLNESS: Patient is a 32 y.o. female. This pleasant patient is here today for medication refills.  She is moving to [place] and desires to have an ample supply to make it through until she is established with medical provider.  She tells me that she has already made an appointment with primary care in [place].  We did refill her medication for only 40 days on her last visit so that we can refill for a full 90 days on this visit.    She reports that her ADHD symptoms are at baseline.  She feels that symptoms are well treated.  No difficulties with sleep.    No problem-specific Assessment & Plan notes found for this encounter.    Current Outpatient Medications Ordered in Epic   Medication Sig Dispense Refill   • ketorolac (TORADOL) 10 MG Tab ketorolac 10 mg tablet   Take 1 tablet every 6 hours by oral route for 4 days.     • amphetamine-dextroamphetamine ER (ADDERALL XR) 30 MG XR capsule Take 1 Capsule by mouth every morning for 90 days. 90 Capsule 0   • hydrOXYzine HCl (ATARAX) 25 MG Tab TAKE 1 TABLET BY MOUTH THREE TIMES DAILY IN THE MORNING, AT NOON, AND AT BEDTIME 90 Tablet 0   • propranolol (INDERAL) 80 MG Tab Take 1 Tablet by mouth 2 times a day. Pt takes daily for migraine 180 Tablet 3   • drospirenone-ethinyl estradiol (RYLAN) 3-0.03 MG per tablet Take 1 Tablet by mouth every day. 84 Tablet 2   • clobetasol (TEMOVATE) 0.05 % Ointment APPLY 1 APPLICATION        TOPICALLY TWO TIMES A DAY 60 g 0   • valACYclovir (VALTREX) 500 MG Tab Take 1 Tablet by mouth 2 times a day. 60 Tablet 11   • rizatriptan (MAXALT-MLT) 10 MG  "disintegrating tablet Take 1 Tablet by mouth one time as needed for Migraine for up to 1 dose. 9 Tablet 11   • DROSPIRENONE PO Take 3 mg by mouth every day. (Patient not taking: Reported on 6/29/2022)     • norgestimate-ethinyl estradiol (ORTHO-CYCLEN) 0.25-35 MG-MCG per tablet Estarylla 0.25 mg-35 mcg tablet (Patient not taking: Reported on 6/29/2022)     • COVID-19 Test Kit ID NOW COVID-19 Test Kit   AS DIRECTED (Patient not taking: Reported on 6/29/2022)     • amitriptyline (ELAVIL) 25 MG Tab amitriptyline 25 mg tablet   Take 1 tablet every day by oral route for 30 days. (Patient not taking: Reported on 6/29/2022)     • citalopram (CELEXA) 10 MG tablet citalopram 10 mg tablet (Patient not taking: Reported on 6/29/2022)     • darifenacin (ENABLEX) 15 MG SR tablet darifenacin ER 15 mg tablet,extended release 24 hr   TAKE 1 TABLET BY MOUTH EVERY DAY (Patient not taking: Reported on 6/29/2022)     • darifenacin (ENABLEX) 15 MG SR tablet Take 15 mg by mouth every day. (Patient not taking: Reported on 6/29/2022)     • ESTARYLLA 0.25-35 MG-MCG per tablet  (Patient not taking: Reported on 6/29/2022)     • ketorolac (TORADOL) 10 MG Tab TAKE 1 TABLET BY MOUTH EVERY 6 HOURS FOR 4 DAYS (Patient not taking: Reported on 6/29/2022)     • rizatriptan (MAXALT) 10 MG tablet rizatriptan 10 mg tablet (Patient not taking: Reported on 6/29/2022)     • nitrofurantoin (MACROBID) 100 MG Cap  (Patient not taking: Reported on 6/29/2022)     • phenazopyridine (PYRIDIUM) 200 MG Tab TAKE 1 TABLET BY MOUTH THREE TIMES DAILY UNTIL DIRECTED TO STOP (Patient not taking: No sig reported)       No current Epic-ordered facility-administered medications on file.     ROS:   Gen: no fevers/chills, unplanned changes in weight  See HPI.  Objective:     Exam: Temp 36.4 °C (97.6 °F) (Temporal)   Ht 1.575 m (5' 2\")   Wt 56.7 kg (125 lb)  Body mass index is 22.86 kg/m².    General: Normal appearing. No distress.  Neurologic: Grossly nonfocal by passive " exam.  Skin: No obvious lesions.  Psych: Normal mood and affect. Alert and oriented x3. Judgment and insight is normal.    Labs:   No pertinent labs.    Assessment & Plan:   32 y.o. female with the following -    1. Attention deficit hyperactivity disorder (ADHD), predominantly inattentive type  -Chronic, stable.  Well treated without side effect.  -PDMP reviewed.  - amphetamine-dextroamphetamine ER (ADDERALL XR) 30 MG XR capsule; Take 1 Capsule by mouth every morning for 90 days.  Dispense: 90 Capsule; Refill: 0    No follow-ups on file.    Please note that this dictation was created using voice recognition software. I have made every reasonable attempt to correct obvious errors, but I expect that there are errors of grammar and possibly content that I did not discover before finalizing the note.    Jameson Lay PA-C 6/29/2022

## (undated) DEVICE — SWABSTICK BENZOIN SINGLE (50EA/BX)

## (undated) DEVICE — WATER IRRIG. STER. 1500 ML - (9/CA)

## (undated) DEVICE — PAD LAP STERILE 18 X 18 - (5/PK 40PK/CA)

## (undated) DEVICE — SODIUM CHL IRRIGATION 0.9% 1000ML (12EA/CA)

## (undated) DEVICE — SET EXTENSION WITH 2 PORTS (48EA/CA) ***PART #2C8610 IS A SUBSTITUTE*****

## (undated) DEVICE — GLOVE BIOGEL SZ 6.5 SURGICAL PF LTX (50PR/BX 4BX/CA)

## (undated) DEVICE — SUTURE 3-0 VICRYL PLUS CT-1 - 36 INCH (36/BX)

## (undated) DEVICE — SUTURE 0 VICRYL PLUS CTX - 36 INCH (36/BX)

## (undated) DEVICE — SUTURE 4-0 27IN VCRL PLUS ANTI (36PK/BX)

## (undated) DEVICE — HEAD HOLDER JUNIOR/ADULT

## (undated) DEVICE — SUTURE 2-0 VICRYL PLUS CT-1 36 (36PK/BX)"

## (undated) DEVICE — KIT  I.V. START (100EA/CA)

## (undated) DEVICE — TRAY SPINAL ANESTHESIA NON-SAFETY (10/CA)

## (undated) DEVICE — ELECTRODE DUAL RETURN W/ CORD - (50/PK)

## (undated) DEVICE — PACK C-SECTION (2EA/CA)

## (undated) DEVICE — KIT SKIN PREP SURG. SOLUTION - DURAPREP (20 KT/CA)

## (undated) DEVICE — CLOSURE SKIN STRIP 1/2 X 4 IN - (STERI STRIP) (50/BX 4BX/CA)

## (undated) DEVICE — TUBING CLEARLINK DUO-VENT - C-FLO (48EA/CA)

## (undated) DEVICE — GLOVE BIOGEL SZ 7 SURGICAL PF LTX - (50PR/BX 4BX/CA)

## (undated) DEVICE — CATHETER IV NON-SAFETY 18 GA X 1 1/4 (50/BX 4BX/CA)

## (undated) DEVICE — HEMOSTAT ARISTA PWD 5 GRAM - (5/BX)

## (undated) DEVICE — DETERGENT RENUZYME PLUS 10 OZ PACKET (50/BX)